# Patient Record
Sex: MALE | Race: WHITE | Employment: UNEMPLOYED | URBAN - METROPOLITAN AREA
[De-identification: names, ages, dates, MRNs, and addresses within clinical notes are randomized per-mention and may not be internally consistent; named-entity substitution may affect disease eponyms.]

---

## 2021-03-17 ENCOUNTER — APPOINTMENT (OUTPATIENT)
Dept: CT IMAGING | Age: 41
End: 2021-03-17
Payer: MEDICARE

## 2021-03-17 ENCOUNTER — HOSPITAL ENCOUNTER (EMERGENCY)
Age: 41
Discharge: HOME | End: 2021-03-17
Payer: MEDICARE

## 2021-03-17 ENCOUNTER — APPOINTMENT (OUTPATIENT)
Dept: GENERAL RADIOLOGY | Age: 41
End: 2021-03-17
Payer: MEDICARE

## 2021-03-17 VITALS
SYSTOLIC BLOOD PRESSURE: 124 MMHG | HEART RATE: 88 BPM | HEIGHT: 60 IN | TEMPERATURE: 97.8 F | BODY MASS INDEX: 25.52 KG/M2 | WEIGHT: 130 LBS | RESPIRATION RATE: 18 BRPM | DIASTOLIC BLOOD PRESSURE: 86 MMHG | OXYGEN SATURATION: 99 %

## 2021-03-17 DIAGNOSIS — F10.920 ACUTE ALCOHOLIC INTOXICATION WITHOUT COMPLICATION (HCC): Primary | ICD-10-CM

## 2021-03-17 LAB
ALBUMIN SERPL-MCNC: 4.7 G/DL (ref 3.5–4.6)
ALP BLD-CCNC: 62 U/L (ref 35–104)
ALT SERPL-CCNC: 25 U/L (ref 0–41)
ANION GAP SERPL CALCULATED.3IONS-SCNC: 9 MEQ/L (ref 9–15)
APTT: 24.8 SEC (ref 24.4–36.8)
AST SERPL-CCNC: 42 U/L (ref 0–40)
BASOPHILS ABSOLUTE: 0.2 K/UL (ref 0–0.2)
BASOPHILS RELATIVE PERCENT: 3.5 %
BILIRUB SERPL-MCNC: 0.3 MG/DL (ref 0.2–0.7)
BUN BLDV-MCNC: 7 MG/DL (ref 6–20)
CALCIUM SERPL-MCNC: 9 MG/DL (ref 8.5–9.9)
CHLORIDE BLD-SCNC: 110 MEQ/L (ref 95–107)
CO2: 21 MEQ/L (ref 20–31)
CREAT SERPL-MCNC: 1.09 MG/DL (ref 0.7–1.2)
EOSINOPHILS ABSOLUTE: 0.3 K/UL (ref 0–0.7)
EOSINOPHILS RELATIVE PERCENT: 5.9 %
ETHANOL PERCENT: 0.16 G/DL
ETHANOL: 187 MG/DL (ref 0–0.08)
GFR AFRICAN AMERICAN: >60
GFR NON-AFRICAN AMERICAN: >60
GLOBULIN: 2.4 G/DL (ref 2.3–3.5)
GLUCOSE BLD-MCNC: 94 MG/DL (ref 70–99)
HCT VFR BLD CALC: 49.8 % (ref 42–52)
HEMOGLOBIN: 16.6 G/DL (ref 14–18)
INR BLD: 1
LYMPHOCYTES ABSOLUTE: 1.2 K/UL (ref 1–4.8)
LYMPHOCYTES RELATIVE PERCENT: 22 %
MACROCYTES: ABNORMAL
MCH RBC QN AUTO: 35.2 PG (ref 27–31.3)
MCHC RBC AUTO-ENTMCNC: 33.3 % (ref 33–37)
MCV RBC AUTO: 105.5 FL (ref 80–100)
MONOCYTES ABSOLUTE: 0.4 K/UL (ref 0.2–0.8)
MONOCYTES RELATIVE PERCENT: 7.4 %
NEUTROPHILS ABSOLUTE: 3.5 K/UL (ref 1.4–6.5)
NEUTROPHILS RELATIVE PERCENT: 61.2 %
PDW BLD-RTO: 14.5 % (ref 11.5–14.5)
PLATELET # BLD: 259 K/UL (ref 130–400)
PLATELET SLIDE REVIEW: NORMAL
POTASSIUM SERPL-SCNC: 3.7 MEQ/L (ref 3.4–4.9)
PROTHROMBIN TIME: 12.8 SEC (ref 12.3–14.9)
RBC # BLD: 4.72 M/UL (ref 4.7–6.1)
SODIUM BLD-SCNC: 140 MEQ/L (ref 135–144)
TOTAL PROTEIN: 7.1 G/DL (ref 6.3–8)
WBC # BLD: 5.7 K/UL (ref 4.8–10.8)

## 2021-03-17 PROCEDURE — 82077 ASSAY SPEC XCP UR&BREATH IA: CPT

## 2021-03-17 PROCEDURE — 99285 EMERGENCY DEPT VISIT HI MDM: CPT

## 2021-03-17 PROCEDURE — 80053 COMPREHEN METABOLIC PANEL: CPT

## 2021-03-17 PROCEDURE — 85730 THROMBOPLASTIN TIME PARTIAL: CPT

## 2021-03-17 PROCEDURE — 72125 CT NECK SPINE W/O DYE: CPT

## 2021-03-17 PROCEDURE — 6830039000 HC L3 TRAUMA ALERT

## 2021-03-17 PROCEDURE — 73130 X-RAY EXAM OF HAND: CPT

## 2021-03-17 PROCEDURE — 85610 PROTHROMBIN TIME: CPT

## 2021-03-17 PROCEDURE — 73110 X-RAY EXAM OF WRIST: CPT

## 2021-03-17 PROCEDURE — 73562 X-RAY EXAM OF KNEE 3: CPT

## 2021-03-17 PROCEDURE — 85025 COMPLETE CBC W/AUTO DIFF WBC: CPT

## 2021-03-17 PROCEDURE — 70450 CT HEAD/BRAIN W/O DYE: CPT

## 2021-03-17 PROCEDURE — 36415 COLL VENOUS BLD VENIPUNCTURE: CPT

## 2021-03-17 ASSESSMENT — ENCOUNTER SYMPTOMS
COUGH: 0
COLOR CHANGE: 0
SHORTNESS OF BREATH: 0
EYE DISCHARGE: 0
SORE THROAT: 0
DIARRHEA: 0
CONSTIPATION: 0
BACK PAIN: 0
WHEEZING: 0
VOMITING: 0
ABDOMINAL PAIN: 0
BLOOD IN STOOL: 0
EYE REDNESS: 0
RHINORRHEA: 0
EYE PAIN: 0
TROUBLE SWALLOWING: 0
NAUSEA: 0

## 2021-03-17 ASSESSMENT — PAIN SCALES - GENERAL: PAINLEVEL_OUTOF10: 7
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
## 2021-03-17 NOTE — ED NOTES
This RN at bedside for rounding and pt noted to have eloped at this time. Alice Silver NP advised.      Mellissa Aponte RN  03/17/21 2224
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
## 2021-03-17 NOTE — ED NOTES
Pt sitting upright on side of ED cot at this time w/no s/s of distress noted. Resp even and unlabored. [place] [employer] at bedside. Will continue to monitor pt.      Giulia Reyes RN  03/17/21 1040

## 2021-10-04 ENCOUNTER — HOSPITAL ENCOUNTER (INPATIENT)
Age: 41
LOS: 3 days | Discharge: HOME | DRG: 896 | End: 2021-10-08
Attending: EMERGENCY MEDICINE | Admitting: INTERNAL MEDICINE
Payer: MEDICARE

## 2021-10-04 ENCOUNTER — APPOINTMENT (OUTPATIENT)
Dept: CT IMAGING | Age: 41
DRG: 896 | End: 2021-10-04
Payer: MEDICARE

## 2021-10-04 ENCOUNTER — APPOINTMENT (OUTPATIENT)
Dept: GENERAL RADIOLOGY | Age: 41
DRG: 896 | End: 2021-10-04
Payer: MEDICARE

## 2021-10-04 DIAGNOSIS — R41.82 ALTERED MENTAL STATUS, UNSPECIFIED ALTERED MENTAL STATUS TYPE: ICD-10-CM

## 2021-10-04 DIAGNOSIS — E87.1 HYPONATREMIA: Primary | ICD-10-CM

## 2021-10-04 DIAGNOSIS — K92.2 UPPER GI BLEED: ICD-10-CM

## 2021-10-04 LAB
BASOPHILS ABSOLUTE: 0 K/UL (ref 0–0.2)
BASOPHILS RELATIVE PERCENT: 0.1 %
EOSINOPHILS ABSOLUTE: 0 K/UL (ref 0–0.7)
EOSINOPHILS RELATIVE PERCENT: 0 %
ETHANOL PERCENT: NORMAL G/DL
ETHANOL: <10 MG/DL (ref 0–0.08)
HCT VFR BLD CALC: 40.8 % (ref 42–52)
HEMOGLOBIN: 14.6 G/DL (ref 14–18)
LACTIC ACID: 1.6 MMOL/L (ref 0.5–2.2)
LIPASE: 21 U/L (ref 12–95)
LYMPHOCYTES ABSOLUTE: 0.4 K/UL (ref 1–4.8)
LYMPHOCYTES RELATIVE PERCENT: 3.5 %
MAGNESIUM: 2.3 MG/DL (ref 1.7–2.4)
MCH RBC QN AUTO: 35 PG (ref 27–31.3)
MCHC RBC AUTO-ENTMCNC: 35.8 % (ref 33–37)
MCV RBC AUTO: 97.9 FL (ref 80–100)
MONOCYTES ABSOLUTE: 0.5 K/UL (ref 0.2–0.8)
MONOCYTES RELATIVE PERCENT: 4.3 %
NEUTROPHILS ABSOLUTE: 10.5 K/UL (ref 1.4–6.5)
NEUTROPHILS RELATIVE PERCENT: 92.1 %
PDW BLD-RTO: 13.2 % (ref 11.5–14.5)
PLATELET # BLD: 207 K/UL (ref 130–400)
RBC # BLD: 4.17 M/UL (ref 4.7–6.1)
WBC # BLD: 11.4 K/UL (ref 4.8–10.8)

## 2021-10-04 PROCEDURE — 86850 RBC ANTIBODY SCREEN: CPT

## 2021-10-04 PROCEDURE — 82077 ASSAY SPEC XCP UR&BREATH IA: CPT

## 2021-10-04 PROCEDURE — 70486 CT MAXILLOFACIAL W/O DYE: CPT

## 2021-10-04 PROCEDURE — 80053 COMPREHEN METABOLIC PANEL: CPT

## 2021-10-04 PROCEDURE — 83605 ASSAY OF LACTIC ACID: CPT

## 2021-10-04 PROCEDURE — 86900 BLOOD TYPING SEROLOGIC ABO: CPT

## 2021-10-04 PROCEDURE — 70450 CT HEAD/BRAIN W/O DYE: CPT

## 2021-10-04 PROCEDURE — 99285 EMERGENCY DEPT VISIT HI MDM: CPT

## 2021-10-04 PROCEDURE — 2580000003 HC RX 258: Performed by: EMERGENCY MEDICINE

## 2021-10-04 PROCEDURE — 96374 THER/PROPH/DIAG INJ IV PUSH: CPT

## 2021-10-04 PROCEDURE — 6360000002 HC RX W HCPCS: Performed by: EMERGENCY MEDICINE

## 2021-10-04 PROCEDURE — 72125 CT NECK SPINE W/O DYE: CPT

## 2021-10-04 PROCEDURE — 83690 ASSAY OF LIPASE: CPT

## 2021-10-04 PROCEDURE — 86901 BLOOD TYPING SEROLOGIC RH(D): CPT

## 2021-10-04 PROCEDURE — 74176 CT ABD & PELVIS W/O CONTRAST: CPT

## 2021-10-04 PROCEDURE — 36415 COLL VENOUS BLD VENIPUNCTURE: CPT

## 2021-10-04 PROCEDURE — 83735 ASSAY OF MAGNESIUM: CPT

## 2021-10-04 PROCEDURE — 96375 TX/PRO/DX INJ NEW DRUG ADDON: CPT

## 2021-10-04 PROCEDURE — 85025 COMPLETE CBC W/AUTO DIFF WBC: CPT

## 2021-10-04 PROCEDURE — 71250 CT THORAX DX C-: CPT

## 2021-10-04 PROCEDURE — 96376 TX/PRO/DX INJ SAME DRUG ADON: CPT

## 2021-10-04 RX ORDER — LORAZEPAM 2 MG/ML
2 INJECTION INTRAMUSCULAR ONCE
Status: COMPLETED | OUTPATIENT
Start: 2021-10-04 | End: 2021-10-04

## 2021-10-04 RX ORDER — LORAZEPAM 2 MG/ML
1 INJECTION INTRAMUSCULAR ONCE
Status: COMPLETED | OUTPATIENT
Start: 2021-10-04 | End: 2021-10-04

## 2021-10-04 RX ORDER — 0.9 % SODIUM CHLORIDE 0.9 %
1000 INTRAVENOUS SOLUTION INTRAVENOUS ONCE
Status: COMPLETED | OUTPATIENT
Start: 2021-10-04 | End: 2021-10-05

## 2021-10-04 RX ADMIN — LORAZEPAM 1 MG: 2 INJECTION INTRAMUSCULAR; INTRAVENOUS at 22:56

## 2021-10-04 RX ADMIN — SODIUM CHLORIDE 1000 ML: 9 INJECTION, SOLUTION INTRAVENOUS at 22:56

## 2021-10-04 RX ADMIN — LORAZEPAM 2 MG: 2 INJECTION INTRAMUSCULAR; INTRAVENOUS at 22:56

## 2021-10-04 NOTE — Clinical Note
Patient Class: Inpatient [101]   REQUIRED: Diagnosis: Hyponatremia [657749]   Estimated Length of Stay: Estimated stay of more than 2 midnights   Admitting Provider: Aramis SAUNDERS 12 [[id number]]   Telemetry/Cardiac Monitoring Required?: Yes

## 2021-10-05 ENCOUNTER — APPOINTMENT (OUTPATIENT)
Dept: ULTRASOUND IMAGING | Age: 41
DRG: 896 | End: 2021-10-05
Payer: MEDICARE

## 2021-10-05 PROBLEM — K92.2 GI BLEED: Status: ACTIVE | Noted: 2021-10-05

## 2021-10-05 PROBLEM — E87.1 HYPONATREMIA: Status: ACTIVE | Noted: 2021-10-05

## 2021-10-05 PROBLEM — E87.6 HYPOKALEMIA: Status: ACTIVE | Noted: 2021-10-05

## 2021-10-05 PROBLEM — G91.9 HYDROCEPHALUS (HCC): Status: ACTIVE | Noted: 2021-10-05

## 2021-10-05 PROBLEM — G93.40 ENCEPHALOPATHY: Status: ACTIVE | Noted: 2021-10-05

## 2021-10-05 PROBLEM — R79.89 ELEVATED LFTS: Status: ACTIVE | Noted: 2021-10-05

## 2021-10-05 LAB
ABO/RH: NORMAL
ALBUMIN SERPL-MCNC: 4.6 G/DL (ref 3.5–4.6)
ALP BLD-CCNC: 94 U/L (ref 35–104)
ALT SERPL-CCNC: 141 U/L (ref 0–41)
AMMONIA: 12 UMOL/L (ref 16–60)
ANION GAP SERPL CALCULATED.3IONS-SCNC: 13 MEQ/L (ref 9–15)
ANION GAP SERPL CALCULATED.3IONS-SCNC: 16 MEQ/L (ref 9–15)
ANION GAP SERPL CALCULATED.3IONS-SCNC: 18 MEQ/L (ref 9–15)
ANTIBODY SCREEN: NORMAL
AST SERPL-CCNC: 570 U/L (ref 0–40)
BILIRUB SERPL-MCNC: 1.8 MG/DL (ref 0.2–0.7)
BUN BLDV-MCNC: 18 MG/DL (ref 6–20)
BUN BLDV-MCNC: 22 MG/DL (ref 6–20)
BUN BLDV-MCNC: 26 MG/DL (ref 6–20)
C-REACTIVE PROTEIN: 140.6 MG/L (ref 0–5)
CALCIUM SERPL-MCNC: 8.5 MG/DL (ref 8.5–9.9)
CALCIUM SERPL-MCNC: 8.7 MG/DL (ref 8.5–9.9)
CALCIUM SERPL-MCNC: 9.9 MG/DL (ref 8.5–9.9)
CHLORIDE BLD-SCNC: 75 MEQ/L (ref 95–107)
CHLORIDE BLD-SCNC: 85 MEQ/L (ref 95–107)
CHLORIDE BLD-SCNC: 87 MEQ/L (ref 95–107)
CO2: 22 MEQ/L (ref 20–31)
CO2: 22 MEQ/L (ref 20–31)
CO2: 26 MEQ/L (ref 20–31)
CREAT SERPL-MCNC: 0.7 MG/DL (ref 0.7–1.2)
CREAT SERPL-MCNC: 0.75 MG/DL (ref 0.7–1.2)
CREAT SERPL-MCNC: 0.9 MG/DL (ref 0.7–1.2)
GFR AFRICAN AMERICAN: >60
GFR NON-AFRICAN AMERICAN: >60
GLOBULIN: 2.9 G/DL (ref 2.3–3.5)
GLUCOSE BLD-MCNC: 110 MG/DL (ref 70–99)
GLUCOSE BLD-MCNC: 121 MG/DL (ref 70–99)
GLUCOSE BLD-MCNC: 82 MG/DL (ref 70–99)
INR BLD: 1
MAGNESIUM: 2.3 MG/DL (ref 1.7–2.4)
PERFORMED ON: NORMAL
POC CREATININE: 1.2 MG/DL (ref 0.9–1.3)
POC SAMPLE TYPE: NORMAL
POTASSIUM REFLEX MAGNESIUM: 2.9 MEQ/L (ref 3.4–4.9)
POTASSIUM SERPL-SCNC: 3.1 MEQ/L (ref 3.4–4.9)
POTASSIUM SERPL-SCNC: 3.2 MEQ/L (ref 3.4–4.9)
PROCALCITONIN: 2.14 NG/ML (ref 0–0.15)
PROTHROMBIN TIME: 12.8 SEC (ref 12.3–14.9)
SARS-COV-2, NAAT: NOT DETECTED
SEDIMENTATION RATE, ERYTHROCYTE: 25 MM (ref 0–10)
SODIUM BLD-SCNC: 119 MEQ/L (ref 135–144)
SODIUM BLD-SCNC: 122 MEQ/L (ref 135–144)
SODIUM BLD-SCNC: 123 MEQ/L (ref 135–144)
T4 FREE: 0.59 NG/DL (ref 0.84–1.68)
TOTAL PROTEIN: 7.5 G/DL (ref 6.3–8)
TSH REFLEX: 4.54 UIU/ML (ref 0.44–3.86)

## 2021-10-05 PROCEDURE — 6360000002 HC RX W HCPCS: Performed by: EMERGENCY MEDICINE

## 2021-10-05 PROCEDURE — 84439 ASSAY OF FREE THYROXINE: CPT

## 2021-10-05 PROCEDURE — 6370000000 HC RX 637 (ALT 250 FOR IP): Performed by: INTERNAL MEDICINE

## 2021-10-05 PROCEDURE — 84443 ASSAY THYROID STIM HORMONE: CPT

## 2021-10-05 PROCEDURE — 99222 1ST HOSP IP/OBS MODERATE 55: CPT | Performed by: PSYCHIATRY & NEUROLOGY

## 2021-10-05 PROCEDURE — 87635 SARS-COV-2 COVID-19 AMP PRB: CPT

## 2021-10-05 PROCEDURE — 82140 ASSAY OF AMMONIA: CPT

## 2021-10-05 PROCEDURE — 2580000003 HC RX 258: Performed by: INTERNAL MEDICINE

## 2021-10-05 PROCEDURE — 85610 PROTHROMBIN TIME: CPT

## 2021-10-05 PROCEDURE — 6360000002 HC RX W HCPCS: Performed by: INTERNAL MEDICINE

## 2021-10-05 PROCEDURE — 76705 ECHO EXAM OF ABDOMEN: CPT

## 2021-10-05 PROCEDURE — 2500000003 HC RX 250 WO HCPCS: Performed by: INTERNAL MEDICINE

## 2021-10-05 PROCEDURE — 6370000000 HC RX 637 (ALT 250 FOR IP): Performed by: NURSE PRACTITIONER

## 2021-10-05 PROCEDURE — 36415 COLL VENOUS BLD VENIPUNCTURE: CPT

## 2021-10-05 PROCEDURE — 2500000003 HC RX 250 WO HCPCS: Performed by: EMERGENCY MEDICINE

## 2021-10-05 PROCEDURE — C9113 INJ PANTOPRAZOLE SODIUM, VIA: HCPCS | Performed by: INTERNAL MEDICINE

## 2021-10-05 PROCEDURE — 85652 RBC SED RATE AUTOMATED: CPT

## 2021-10-05 PROCEDURE — 83930 ASSAY OF BLOOD OSMOLALITY: CPT

## 2021-10-05 PROCEDURE — 80048 BASIC METABOLIC PNL TOTAL CA: CPT

## 2021-10-05 PROCEDURE — 86140 C-REACTIVE PROTEIN: CPT

## 2021-10-05 PROCEDURE — 99221 1ST HOSP IP/OBS SF/LOW 40: CPT | Performed by: INTERNAL MEDICINE

## 2021-10-05 PROCEDURE — 6360000002 HC RX W HCPCS: Performed by: NURSE PRACTITIONER

## 2021-10-05 PROCEDURE — 83735 ASSAY OF MAGNESIUM: CPT

## 2021-10-05 PROCEDURE — 2580000003 HC RX 258: Performed by: NURSE PRACTITIONER

## 2021-10-05 PROCEDURE — 1210000000 HC MED SURG R&B

## 2021-10-05 PROCEDURE — 84145 PROCALCITONIN (PCT): CPT

## 2021-10-05 RX ORDER — SODIUM CHLORIDE 0.9 % (FLUSH) 0.9 %
5-40 SYRINGE (ML) INJECTION EVERY 12 HOURS SCHEDULED
Status: DISCONTINUED | OUTPATIENT
Start: 2021-10-05 | End: 2021-10-08 | Stop reason: HOSPADM

## 2021-10-05 RX ORDER — LORAZEPAM 2 MG/ML
3 INJECTION INTRAMUSCULAR
Status: DISCONTINUED | OUTPATIENT
Start: 2021-10-05 | End: 2021-10-08 | Stop reason: HOSPADM

## 2021-10-05 RX ORDER — MAGNESIUM SULFATE IN WATER 40 MG/ML
2000 INJECTION, SOLUTION INTRAVENOUS PRN
Status: DISCONTINUED | OUTPATIENT
Start: 2021-10-05 | End: 2021-10-08 | Stop reason: HOSPADM

## 2021-10-05 RX ORDER — LACTULOSE 10 G/15ML
20 SOLUTION ORAL 3 TIMES DAILY
Status: DISCONTINUED | OUTPATIENT
Start: 2021-10-05 | End: 2021-10-07

## 2021-10-05 RX ORDER — ACETAMINOPHEN 325 MG/1
650 TABLET ORAL EVERY 6 HOURS PRN
Status: DISCONTINUED | OUTPATIENT
Start: 2021-10-05 | End: 2021-10-08 | Stop reason: HOSPADM

## 2021-10-05 RX ORDER — POLYETHYLENE GLYCOL 3350 17 G/17G
17 POWDER, FOR SOLUTION ORAL DAILY PRN
Status: DISCONTINUED | OUTPATIENT
Start: 2021-10-05 | End: 2021-10-08 | Stop reason: HOSPADM

## 2021-10-05 RX ORDER — LORAZEPAM 1 MG/1
3 TABLET ORAL
Status: DISCONTINUED | OUTPATIENT
Start: 2021-10-05 | End: 2021-10-08 | Stop reason: HOSPADM

## 2021-10-05 RX ORDER — ONDANSETRON 2 MG/ML
4 INJECTION INTRAMUSCULAR; INTRAVENOUS EVERY 6 HOURS PRN
Status: DISCONTINUED | OUTPATIENT
Start: 2021-10-05 | End: 2021-10-08 | Stop reason: HOSPADM

## 2021-10-05 RX ORDER — SODIUM CHLORIDE 9 MG/ML
25 INJECTION, SOLUTION INTRAVENOUS PRN
Status: DISCONTINUED | OUTPATIENT
Start: 2021-10-05 | End: 2021-10-08 | Stop reason: HOSPADM

## 2021-10-05 RX ORDER — LORAZEPAM 2 MG/ML
4 INJECTION INTRAMUSCULAR
Status: DISCONTINUED | OUTPATIENT
Start: 2021-10-05 | End: 2021-10-08 | Stop reason: HOSPADM

## 2021-10-05 RX ORDER — DEXTROSE AND SODIUM CHLORIDE 5; .9 G/100ML; G/100ML
INJECTION, SOLUTION INTRAVENOUS CONTINUOUS
Status: DISCONTINUED | OUTPATIENT
Start: 2021-10-05 | End: 2021-10-06

## 2021-10-05 RX ORDER — LORAZEPAM 2 MG/ML
2 INJECTION INTRAMUSCULAR ONCE
Status: COMPLETED | OUTPATIENT
Start: 2021-10-05 | End: 2021-10-05

## 2021-10-05 RX ORDER — SODIUM CHLORIDE 0.9 % (FLUSH) 0.9 %
5-40 SYRINGE (ML) INJECTION PRN
Status: DISCONTINUED | OUTPATIENT
Start: 2021-10-05 | End: 2021-10-08 | Stop reason: HOSPADM

## 2021-10-05 RX ORDER — LORAZEPAM 1 MG/1
1 TABLET ORAL
Status: DISCONTINUED | OUTPATIENT
Start: 2021-10-05 | End: 2021-10-08 | Stop reason: HOSPADM

## 2021-10-05 RX ORDER — SODIUM CHLORIDE 9 MG/ML
10 INJECTION INTRAVENOUS 2 TIMES DAILY
Status: DISCONTINUED | OUTPATIENT
Start: 2021-10-05 | End: 2021-10-08 | Stop reason: HOSPADM

## 2021-10-05 RX ORDER — LORAZEPAM 1 MG/1
2 TABLET ORAL
Status: DISCONTINUED | OUTPATIENT
Start: 2021-10-05 | End: 2021-10-08 | Stop reason: HOSPADM

## 2021-10-05 RX ORDER — POTASSIUM CHLORIDE 7.45 MG/ML
10 INJECTION INTRAVENOUS PRN
Status: DISCONTINUED | OUTPATIENT
Start: 2021-10-05 | End: 2021-10-08 | Stop reason: HOSPADM

## 2021-10-05 RX ORDER — ONDANSETRON 4 MG/1
4 TABLET, ORALLY DISINTEGRATING ORAL EVERY 8 HOURS PRN
Status: DISCONTINUED | OUTPATIENT
Start: 2021-10-05 | End: 2021-10-08 | Stop reason: HOSPADM

## 2021-10-05 RX ORDER — LORAZEPAM 2 MG/ML
1 INJECTION INTRAMUSCULAR
Status: DISCONTINUED | OUTPATIENT
Start: 2021-10-05 | End: 2021-10-08 | Stop reason: HOSPADM

## 2021-10-05 RX ORDER — MULTIVIT-MIN/FERROUS GLUCONATE 9 MG/15 ML
15 LIQUID (ML) ORAL DAILY
Status: DISCONTINUED | OUTPATIENT
Start: 2021-10-05 | End: 2021-10-08 | Stop reason: HOSPADM

## 2021-10-05 RX ORDER — LORAZEPAM 2 MG/ML
2 INJECTION INTRAMUSCULAR
Status: DISCONTINUED | OUTPATIENT
Start: 2021-10-05 | End: 2021-10-08 | Stop reason: HOSPADM

## 2021-10-05 RX ORDER — POTASSIUM CHLORIDE 7.45 MG/ML
10 INJECTION INTRAVENOUS
Status: DISCONTINUED | OUTPATIENT
Start: 2021-10-05 | End: 2021-10-05

## 2021-10-05 RX ORDER — THIAMINE HYDROCHLORIDE 100 MG/ML
100 INJECTION, SOLUTION INTRAMUSCULAR; INTRAVENOUS DAILY
Status: DISCONTINUED | OUTPATIENT
Start: 2021-10-05 | End: 2021-10-07

## 2021-10-05 RX ORDER — ACETAMINOPHEN 650 MG/1
650 SUPPOSITORY RECTAL EVERY 6 HOURS PRN
Status: DISCONTINUED | OUTPATIENT
Start: 2021-10-05 | End: 2021-10-08 | Stop reason: HOSPADM

## 2021-10-05 RX ORDER — LORAZEPAM 1 MG/1
4 TABLET ORAL
Status: DISCONTINUED | OUTPATIENT
Start: 2021-10-05 | End: 2021-10-08 | Stop reason: HOSPADM

## 2021-10-05 RX ORDER — PANTOPRAZOLE SODIUM 40 MG/10ML
40 INJECTION, POWDER, LYOPHILIZED, FOR SOLUTION INTRAVENOUS 2 TIMES DAILY
Status: DISCONTINUED | OUTPATIENT
Start: 2021-10-05 | End: 2021-10-07

## 2021-10-05 RX ADMIN — Medication 10 ML: at 20:36

## 2021-10-05 RX ADMIN — SODIUM CHLORIDE, PRESERVATIVE FREE 10 ML: 5 INJECTION INTRAVENOUS at 20:54

## 2021-10-05 RX ADMIN — PANTOPRAZOLE SODIUM 40 MG: 40 INJECTION, POWDER, FOR SOLUTION INTRAVENOUS at 10:00

## 2021-10-05 RX ADMIN — OCTREOTIDE ACETATE 25 MCG/HR: 500 INJECTION, SOLUTION INTRAVENOUS; SUBCUTANEOUS at 05:50

## 2021-10-05 RX ADMIN — DEXTROSE AND SODIUM CHLORIDE: 5; 900 INJECTION, SOLUTION INTRAVENOUS at 12:11

## 2021-10-05 RX ADMIN — POTASSIUM CHLORIDE 10 MEQ: 7.46 INJECTION, SOLUTION INTRAVENOUS at 21:37

## 2021-10-05 RX ADMIN — DEXTROSE AND SODIUM CHLORIDE: 5; 900 INJECTION, SOLUTION INTRAVENOUS at 20:54

## 2021-10-05 RX ADMIN — Medication 15 ML: at 12:11

## 2021-10-05 RX ADMIN — Medication 10 ML: at 12:12

## 2021-10-05 RX ADMIN — Medication 10 ML: at 10:00

## 2021-10-05 RX ADMIN — SODIUM CHLORIDE 0.5 MCG/KG/HR: 9 INJECTION, SOLUTION INTRAVENOUS at 05:34

## 2021-10-05 RX ADMIN — POTASSIUM CHLORIDE 10 MEQ: 7.46 INJECTION, SOLUTION INTRAVENOUS at 22:25

## 2021-10-05 RX ADMIN — LACTULOSE 20 G: 20 SOLUTION ORAL at 20:36

## 2021-10-05 RX ADMIN — LACTULOSE 20 G: 20 SOLUTION ORAL at 10:00

## 2021-10-05 RX ADMIN — CEFTRIAXONE SODIUM 1000 MG: 1 INJECTION, POWDER, FOR SOLUTION INTRAMUSCULAR; INTRAVENOUS at 12:17

## 2021-10-05 RX ADMIN — FAMOTIDINE 40 MG: 10 INJECTION INTRAVENOUS at 02:45

## 2021-10-05 RX ADMIN — PANTOPRAZOLE SODIUM 40 MG: 40 INJECTION, POWDER, FOR SOLUTION INTRAVENOUS at 20:54

## 2021-10-05 RX ADMIN — SODIUM CHLORIDE, PRESERVATIVE FREE 10 ML: 5 INJECTION INTRAVENOUS at 10:00

## 2021-10-05 RX ADMIN — LACTULOSE 20 G: 20 SOLUTION ORAL at 14:45

## 2021-10-05 RX ADMIN — LORAZEPAM 2 MG: 2 INJECTION INTRAMUSCULAR; INTRAVENOUS at 03:09

## 2021-10-05 RX ADMIN — POTASSIUM CHLORIDE 10 MEQ: 7.46 INJECTION, SOLUTION INTRAVENOUS at 20:35

## 2021-10-05 RX ADMIN — THIAMINE HYDROCHLORIDE 100 MG: 100 INJECTION, SOLUTION INTRAMUSCULAR; INTRAVENOUS at 12:15

## 2021-10-05 ASSESSMENT — PAIN SCALES - WONG BAKER
WONGBAKER_NUMERICALRESPONSE: 0
WONGBAKER_NUMERICALRESPONSE: 0
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
## 2021-10-05 NOTE — CONSULTS
Consult to Gastroenterology  Consult performed by: Tammy Quinonez MD  Consult ordered by: Kenrick Bella MD      Patient Name: Josue Dominguez Date: 10/4/2021 10:00 PM  MR #: [id number]  : 1980    Attending Physician: Krystle Gleason DO  Reason for consult: Upper GI bleed  History Obtained From:  electronic medical record, staff nurse  History of Presenting Illness:      Edna Villalobos is a 41 y.o. male on hospital day 0 with PMH neuroblastoma (@1 years old s/p resection/ shunt and multiple revisions), Graves disease, chronic alcohol abuse, admitted with a history of acute metabolic encephalopathy. GI consulted for upper GI bleed. Of note, patient's HPI is limited due to patient's mental status/encephalopathy. Patient was found wandering the streets behind a restaurant, was admitted to the hospital, severely agitated. Patient was found to have dark stools in the ER, guaiac positive. Did give some short answers during interview. When asked how much alcohol he drinks he stated, \" not much. \"  Per nursing, patient has had no further bowel movement since admission to the floor. Of note, on 3/17/2021, Hgb 16.6, .5, platelets 116, albumin 4.7, ALT 62, AST 25, ethanol level 187. On admit, WBCs 11.4, Hgb 14.6, MCV 97.9, platelets 447, sodium 119, potassium 3.1, BUN 26, creatinine 0.90, albumin 4.6, bilirubin 1.8, alkaline phosphatase 94, , , INR 1.0, ethanol level less than 10. He received Ativan 3 mg IV in the ER due to ongoing agitation. He was started on Precedex drip that was discontinued on the floor today. He was also given Protonix bolus and infusion. Today, ammonia 12. Previous endoscopic history:   No records    History:      Past Medical History:   Diagnosis Date    Brain tumor ([facility] [address]     Graves disease      No past surgical history on file. Family History  No family history on file.   [x] Unable to obtain due to ventilated and/ or neurologic status  Social History     Socioeconomic History    Marital status: Single     Spouse name: Not on file    Number of children: Not on file    Years of education: Not on file    Highest education level: Not on file   Occupational History    Not on file   Tobacco Use    Smoking status: Current Every Day Smoker     Packs/day: 2.00     Types: Cigarettes    Smokeless tobacco: Never Used   Substance and Sexual Activity    Alcohol use: Yes     Comment: Every couple of days    Drug use: Yes     Types: Marijuana     Comment: ocassionally    Sexual activity: Not on file   Other Topics Concern    Not on file   Social History Narrative    Not on file     Social Determinants of Health     Financial Resource Strain:     Difficulty of Paying Living Expenses:    Food Insecurity:     Worried About Running Out of Food in the Last Year:     Ran Out of Food in the Last Year:    Transportation Needs:     Lack of Transportation (Medical):  Lack of Transportation (Non-Medical):    Physical Activity:     Days of Exercise per Week:     Minutes of Exercise per Session:    Stress:     Feeling of Stress :    Social Connections:     Frequency of Communication with Friends and Family:     Frequency of Social Gatherings with Friends and Family:     Attends [employer] Services:     Active Member of Clubs or Organizations:     Attends Club or Organization Meetings:     Marital Status:    Intimate Partner Violence:     Fear of Current or Ex-Partner:     Emotionally Abused:     Physically Abused:     Sexually Abused:       [x] Unable to obtain due to ventilated and/ or neurologic status    Home Medications:      No medications prior to admission.     Current Hospital Medications:   Scheduled Meds:   sodium chloride flush  5-40 mL IntraVENous 2 times per day    pantoprazole  40 mg IntraVENous BID    And    sodium chloride (PF)  10 mL IntraVENous BID    cefTRIAXone (ROCEPHIN) IV  1,000 mg IntraVENous Q24H    lactulose  20 g Oral TID    sodium chloride flush  5-40 mL IntraVENous 2 times per day    thiamine  100 mg IntraVENous Daily    CENTRUM/CERTA-ANI with minerals oral  15 mL Oral Daily     Continuous Infusions:   sodium chloride      octreotide (SANDOSTATIN) infusion 25 mcg/hr (10/05/21 0550)    dexmedetomidine 0.5 mcg/kg/hr (10/05/21 0534)    dextrose 5 % and 0.9 % NaCl      sodium chloride       PRN Meds:.sodium chloride flush, sodium chloride, ondansetron **OR** ondansetron, polyethylene glycol, acetaminophen **OR** acetaminophen, potassium chloride, magnesium sulfate, sodium chloride flush, sodium chloride, LORazepam **OR** LORazepam **OR** LORazepam **OR** LORazepam **OR** LORazepam **OR** LORazepam **OR** LORazepam **OR** LORazepam   sodium chloride      octreotide (SANDOSTATIN) infusion 25 mcg/hr (10/05/21 0550)    dexmedetomidine 0.5 mcg/kg/hr (10/05/21 0534)    dextrose 5 % and 0.9 % NaCl      sodium chloride        Allergies: Allergies   Allergen Reactions    Red Dye       Review of Systems:        Unable to obtain due to patient's mental status/encephalopathy    Objective Findings:     Vitals:   Vitals:    10/05/21 0515 10/05/21 0530 10/05/21 0545 10/05/21 0839   BP: 114/79 127/85 (!) 122/103 127/65   Pulse: 106 105 103 92   Resp:    16   Temp:    98.1 °F (36.7 °C)   TempSrc:       SpO2: 98% 98% 95% 94%   Weight:       Height:          Physical Examination:  General: At time of his interview patient was somewhat lethargic, did answer yes/no to some questions, knew he was in a hospital  HEENT: Normocephalic, no scleral icterus. Neck: No JVD. Heart: Regular, no murmur, no rub/gallop. No RV heave. Lungs: Clear to ascultation, no rales/wheezing/rhonchi. Good chest wall excursion. Abdomen: Appearance: No Distension, Soft , no tenderness, Bowel sounds normal  Extremities: No clubbing/cyanosis, no edema. Skin: Warm, dry, normal turgor, no rash, no petechiae, abrasion noted on right upper cheek.eye. Neuro:  No myoclonus or tremor   Psych: Normal affect    Results/ Medications reviewed 10/5/2021, 9:06 AM     Laboratory, Microbiology, Pathology, Radiology, Cardiology, Medications and Transcriptions reviewed  Scheduled Meds:   sodium chloride flush  5-40 mL IntraVENous 2 times per day    pantoprazole  40 mg IntraVENous BID    And    sodium chloride (PF)  10 mL IntraVENous BID    cefTRIAXone (ROCEPHIN) IV  1,000 mg IntraVENous Q24H    lactulose  20 g Oral TID    sodium chloride flush  5-40 mL IntraVENous 2 times per day    thiamine  100 mg IntraVENous Daily    CENTRUM/CERTA-ANI with minerals oral  15 mL Oral Daily     Continuous Infusions:   sodium chloride      octreotide (SANDOSTATIN) infusion 25 mcg/hr (10/05/21 0550)    dexmedetomidine 0.5 mcg/kg/hr (10/05/21 0534)    dextrose 5 % and 0.9 % NaCl      sodium chloride         Recent Labs     10/04/21  2215   WBC 11.4*   HGB 14.6   HCT 40.8*   MCV 97.9        Recent Labs     10/04/21  2215   *   K 3.1*   CL 75*   CO2 26   BUN 26*   CREATININE 0.90     Recent Labs     10/04/21  2215   *   *   BILITOT 1.8*   ALKPHOS 94     Recent Labs     10/04/21  2215   LIPASE 21     Recent Labs     10/04/21  2215 10/05/21  0302   PROT 7.5  --    INR  --  1.0     CT head without contrast October 5, 2021: No acute intracranial process,  shunt appears unchanged. CT abdomen pelvis without contrast 10/5/2021: Limited study secondary to motion artifact, however essentially negative.  shunt catheter tip in mid pelvis. Ultrasound RUQ abdomen 10/5/2021: Liver normal in size/shape/echogenicity, CBD measures 3.3 mm. Negative RUQ ultrasound. Assessment:   41 y.o. male with PMH neuroblastoma (@1 years old s/p resection/ shunt and multiple revisions), Graves disease, chronic alcohol abuse, admitted with a history of acute metabolic encephalopathy. Of note, patient's HPI is limited due to patient's mental status/encephalopathy.   He was found to have dark stools in the ER, guaiac positive. Per nursing, no further bowel movements since admission to hospital floor yesterday. Of note, on 3/17/2021, Hgb 16.6, .5, platelets 237, albumin 4.7, ALT 62, AST 25, ethanol level 187. On admit, WBCs 11.4, Hgb 14.6, MCV 97.9, platelets 995, sodium 119, potassium 3.1, BUN 26, creatinine 0.90, albumin 4.6, bilirubin 1.8, alkaline phosphatase 94, , , INR 1.0, ethanol level less than 10. Today, ammonia level was 12. He received Ativan 3 mg IV in the ER due to ongoing agitation. He was started on Precedex drip that was discontinued on the floor today. He was also given Protonix bolus and infusion. Then was switched to Protonix 40 mg IV twice daily and octreotide drip on floor. Impression:  Suspect acute alcoholic hepatitis in the setting of alcohol. No clinical or radiological evidence for cirrhosis. Withdrawal/hyponatremia/encephalopathy. Dark stools in the ER raise suspicion for upper GIB (?gastritis/PUD) in the setting of alcohol use. Also in differential remains encephalopathy due to shunt infection, although less likely as patient has had no fevers, WBCs minimally elevated, CT head/abdomen reveals shunt in place/unchanged. Hgb currently stable at 14.6. Plan:   - Recommend EtOH cessation. Rec EtOH abuse counseling as outpt  - Banana bag, thiamine, folate, trace elements.  Monitor for withdrawal  - Nutritionist consult; rec multiple feedings, emphasizing breakfast and a nighttime snack, with a regular oral diet at 1.2-1.5 g of protein/kg/day and 35-40 silva/kg/day  -Monitor electrolytes and replete as needed  -Recommend r/o infectious causes: UA with reflex culture, blood cultures, obtain CXR, neurology to consider lumbar puncture (hx of  shunt)  -Monitor LFTs and INR daily   - IV PPI - 8 mg/hr for 72 hrs  - Monitor H/H and transfuse accordingly  - Continue IVF for hypotension  - Avoid NSAID's  -May discontinue octreotide    Comments: Thank you for allowing us to participate in the care of this patient. Will continue to follow. Please call if questions or concerns arise. Electronically signed by Fabian Herrmann MD on 10/5/2021 at 9:06 AM    Please note this report has been partially produced using speech recognition software and may cause contain errors related to that system including grammar, punctuation and spelling as well as words and phrases that may seem inappropriate. If there are questions or concerns please feel free to contact me to clarify.

## 2021-10-05 NOTE — ED PROVIDER NOTES
[address] [facility] ED  EMERGENCY MEDICINE     Pt Name: Janet Mattson  MRN: [id number]  [place] 1980  Date of evaluation: 10/4/2021  PCP:    Luis Felipe Buenrostro MD  Provider: Erna Fenton, [facility]       Chief Complaint   Patient presents with    Altered Mental Status       HISTORY OF PRESENT ILLNESS    HPI     47-year-old male with history of chronic alcohol use, brain tumor, Graves' disease presents to the emergency department with complaint of altered mentation by EMS. Patient was found behind a restaurant today altered. He has a bruised right eye and is confused. Unsure whether or not he was drinking today but he does have a known history of chronic alcohol use. Patient was able to tell us his name and date of birth but is unaware as to where he is. He does have signs of a past craniotomy. Patient is not complaining of any pain at this time and states that he is just cold. Triage notes and Nursing notes were reviewed by myself. Any discrepancies are addressed above. PAST MEDICAL HISTORY     Past Medical History:   Diagnosis Date    Brain tumor ([facility] [address]     Graves disease        SURGICAL HISTORY     No past surgical history on file. CURRENT MEDICATIONS       Previous Medications    No medications on file       ALLERGIES       Allergies   Allergen Reactions    Red Dye        FAMILY HISTORY     No family history on file.      SOCIAL HISTORY       Social History     Socioeconomic History    Marital status: Single     Spouse name: Not on file    Number of children: Not on file    Years of education: Not on file    Highest education level: Not on file   Occupational History    Not on file   Tobacco Use    Smoking status: Current Every Day Smoker     Packs/day: 2.00     Types: Cigarettes    Smokeless tobacco: Never Used   Substance and Sexual Activity    Alcohol use: Yes     Comment: Every couple of days    Drug use: Yes     Types: Marijuana     Comment: ocassionally    Sexual activity: Not on file   Other Topics Concern    Not on file   Social History Narrative    Not on file     Social Determinants of Health     Financial Resource Strain:     Difficulty of Paying Living Expenses:    Food Insecurity:     Worried About Running Out of Food in the Last Year:     [address] in the Last Year:    Transportation Needs:     Lack of Transportation (Medical):  Lack of Transportation (Non-Medical):    Physical Activity:     Days of Exercise per Week:     Minutes of Exercise per Session:    Stress:     Feeling of Stress :    Social Connections:     Frequency of Communication with Friends and Family:     Frequency of Social Gatherings with Friends and Family:     Attends [employer] Services:     Active Member of Clubs or Organizations:     Attends Club or Organization Meetings:     Marital Status:    Intimate Partner Violence:     Fear of Current or Ex-Partner:     Emotionally Abused:     Physically Abused:     Sexually Abused:        REVIEW OF SYSTEMS     Review of Systems   Unable to perform ROS: Mental status change       Except as noted above the remainder of the review of systems was reviewed and is negative. SCREENINGS                        PHYSICAL EXAM    (up to 7 for level 4, 8 or more for level 5)     ED Triage Vitals [10/04/21 2206]   BP Temp Temp Source Pulse Resp SpO2 Height Weight   (!) 160/84 98.2 °F (36.8 °C) Axillary 108 18 99 % 5' (1.524 m) 130 lb (59 kg)       Physical Exam  Constitutional:       Appearance: He is toxic-appearing. HENT:      Head: Normocephalic. Abrasion (Superficial to the left maxilla) present. No raccoon eyes. Comments: Extraocular motions intact without pain. Mouth/Throat:      Mouth: Mucous membranes are moist.      Comments: Dried blood on his lips  Eyes:      General: No scleral icterus. Extraocular Movements: Extraocular movements intact. Right eye: Normal extraocular motion. Left eye: Normal extraocular motion and no nystagmus. Pupils: Pupils are equal, round, and reactive to light. Right eye: Pupil is round. Left eye: Pupil is round. Cardiovascular:      Rate and Rhythm: Normal rate. Heart sounds: Normal heart sounds. Pulmonary:      Effort: Pulmonary effort is normal.      Breath sounds: Normal breath sounds. Abdominal:      General: Bowel sounds are normal.      Palpations: Abdomen is soft. Tenderness: There is no abdominal tenderness. There is no guarding. Musculoskeletal:         General: No swelling or tenderness. Normal range of motion. Cervical back: Normal range of motion and neck supple. Comments: Superficial abrasions on his knees   Skin:     General: Skin is warm. Capillary Refill: Capillary refill takes less than 2 seconds. Neurological:      Mental Status: He is alert. He is disoriented and confused. GCS: GCS eye subscore is 4. GCS verbal subscore is 4. GCS motor subscore is 5. Cranial Nerves: No cranial nerve deficit or dysarthria. Sensory: No sensory deficit. Motor: No weakness. Comments: Confused alert and oriented to self and date of birth  Amnestic to events today, smells of alcohol. DIAGNOSTIC RESULTS     EKG:(none if blank)  All EKGs are interpreted by the Emergency Department Physician who either signs or Co-signs this chart in the absence of a cardiologist.        RADIOLOGY: (none if blank)   I directly visualized the following images and reviewed the radiologist interpretations.   Interpretation per the Radiologist below, if available at the time of this note:  CT HEAD [address]    (Results Pending)   CT CERVICAL SPINE WO CONTRAST    (Results Pending)   CT FACIAL BONES WO CONTRAST    (Results Pending)   CT CHEST WO CONTRAST    (Results Pending)   CT ABDOMEN PELVIS WO CONTRAST Additional Contrast? None    (Results Pending)   CT chest without contrast shows motion artifact with minimal atelectasis or airspace disease in the anterior right upper lobe. There is distal esophageal wall thickening which may represent esophagitis but no pneumomediastinum. Otherwise nonacute findings on this contrast study. CT head without contrast shows no evidence of acute intracranial abnormality. He does have stable postoperative changes of the posterior fossa including suboccipital craniectomy and right lateral occipital cranioplasty. Stable enlarged appearance of the fourth ventricle. He does have a  shunt noted. CT facial without contrast shows no evidence of acute fracture or dislocation. Bilateral cataract surgery. CT C-spine shows no evidence of acute fracture or malalignment. Minimal hazy airspace disease in the right upper anterior lobe. LABS:  Labs Reviewed   COMPREHENSIVE METABOLIC PANEL - Abnormal; Notable for the following components:       Result Value    Sodium 119 (*)     Potassium 3.1 (*)     Chloride 75 (*)     Anion Gap 18 (*)     BUN 26 (*)     Total Bilirubin 1.8 (*)      (*)      (*)     All other components within normal limits    Narrative:     CALL  [place]  [facility] tel. [phone],  [facility] results called to and read back by Alexey VINCENT, 10/05/2021 00:05,  by [facility]   CBC WITH AUTO DIFFERENTIAL - Abnormal; Notable for the following components:    WBC 11.4 (*)     RBC 4.17 (*)     Hematocrit 40.8 (*)     MCH 35.0 (*)     Neutrophils Absolute 10.5 (*)     Lymphocytes Absolute 0.4 (*)     All other components within normal limits   ETHANOL   LIPASE   MAGNESIUM   LACTIC ACID, PLASMA   URINE DRUG SCREEN   URINE RT REFLEX TO CULTURE   TYPE AND SCREEN       All other labs were within normal range or not returned as of this dictation. Please note, any cultures that may have been sent were not resulted at the time of this patient visit.     EMERGENCY DEPARTMENT COURSE and Medical Decision Making:     Vitals:    Vitals:    10/04/21 2215 10/04/21 2230 10/04/21 2300 10/04/21 2315   BP:  116/85 139/86    Pulse: 103  110 106   Resp:       Temp:       TempSrc:       SpO2:       Weight:       Height:           PROCEDURES: (None if blank)  Procedures       MDM     Patient was found to have dark stools on exam as he did have a bowel movement on the bed. It was guaiaced and ended up being positive. His hemoglobin at this point is stable. Patient was altered upon arrival.  His ethanol level is negative. He did have a sodium of 119. Most likely due to beer protamine he is secondary to his chronic alcohol use. CTs of his head, facial bones, and C-spine were negative for any acute processes. He does have a history of a brain tumor status post resection. Patient was becoming agitated and trying to pull out his lines. We did give him 3 mg of Ativan to initially in 1 to follow. Secondary to his upper GI bleed, patient was given a Protonix bolus followed by an infusion. Patient will need to be admitted for further work-up of his hyponatremia as well as his GI bleed. Strict return precautions and follow up instructions were discussed with the patient with which the patient agrees    ED Medications administered this visit:    Medications   pantoprazole (PROTONIX) 80 mg in sodium chloride 0.9 % 50 mL bolus (has no administration in time range)   pantoprazole (PROTONIX) 80 mg in sodium chloride 0.9 % 100 mL infusion (has no administration in time range)   0.9 % sodium chloride bolus (0 mLs IntraVENous Stopped 10/5/21 0106)   LORazepam (ATIVAN) injection 1 mg (1 mg IntraVENous Given 10/4/21 2256)   LORazepam (ATIVAN) injection 2 mg (2 mg IntraVENous Given 10/4/21 2256)         FINAL IMPRESSION      1. Hyponatremia    2. Upper GI bleed    3. Altered mental status, unspecified altered mental status type          DISPOSITION/PLAN   DISPOSITION Decision To Admit 10/05/2021 12:26:10 AM      PATIENT REFERRED TO:  No follow-up provider specified.     DISCHARGE MEDICATIONS:  New Prescriptions    No medications on file              Wilda Jade DO (electronically signed)  Attending Physician, Emergency Department         Wilda Jade DO  10/05/21 Michael Cabello DO  10/05/21 2263

## 2021-10-05 NOTE — ED NOTES
Bed: 07  Expected date: 10/4/21  Expected time: 9:49 PM  Means of arrival: [facility]  Comments:  41year old male poss gi bleed  AMS  Semi combative  Gi bleed   [id number]  17  99.4  [address], RN  10/04/21 1855

## 2021-10-05 NOTE — H&P
[facility]  MEDICINE    HISTORY AND PHYSICAL EXAM    PATIENT NAME:  Yessenia Cook    MRN:  [id number]  SERVICE DATE:  10/5/2021   SERVICE TIME:  2:52 AM    Primary Care Physician: Cam López MD         SUBJECTIVE  CHIEF COMPLAINT:  AMS    HPI: Patient brought to the ER after being found with altered mental status public. Patient brought in confused with multiple abrasions and bruising throughout his body. Patient unable to provide history as far as current as well as past medical illness. Patient responds to verbal and painful stimuli. Patient gives an occasional one-word response to few questions. But offers no meaningful communication. Patient does have a past medical history of brain tumor (age 1), hydrocephalus with  shunt infections, Graves' disease, EtOH abuse, and neuroblastoma. PAST MEDICAL HISTORY:    Past Medical History:   Diagnosis Date    Brain tumor ([facility] [address]     Graves disease      PAST SURGICAL HISTORY:  No past surgical history on file. FAMILY HISTORY:  No family history on file.   SOCIAL HISTORY:    Social History     Socioeconomic History    Marital status: Single     Spouse name: Not on file    Number of children: Not on file    Years of education: Not on file    Highest education level: Not on file   Occupational History    Not on file   Tobacco Use    Smoking status: Current Every Day Smoker     Packs/day: 2.00     Types: Cigarettes    Smokeless tobacco: Never Used   Substance and Sexual Activity    Alcohol use: Yes     Comment: Every couple of days    Drug use: Yes     Types: Marijuana     Comment: ocassionally    Sexual activity: Not on file   Other Topics Concern    Not on file   Social History Narrative    Not on file     Social Determinants of Health     Financial Resource Strain:     Difficulty of Paying Living Expenses:    Food Insecurity:     Worried About Running Out of Food in the Last Year:     [address] in the Last Year: Transportation Needs:     Lack of Transportation (Medical):  Lack of Transportation (Non-Medical):    Physical Activity:     Days of Exercise per Week:     Minutes of Exercise per Session:    Stress:     Feeling of Stress :    Social Connections:     Frequency of Communication with Friends and Family:     Frequency of Social Gatherings with Friends and Family:     Attends Hinduism Services:     Active Member of Clubs or Organizations:     Attends Club or Organization Meetings:     Marital Status:    Intimate Partner Violence:     Fear of Current or Ex-Partner:     Emotionally Abused:     Physically Abused:     Sexually Abused:      MEDICATIONS:   Prior to Admission medications    Not on File       ALLERGIES: Red dye    REVIEW OF SYSTEM:   Review of Systems   Unable to perform ROS: Acuity of condition         OBJECTIVE  PHYSICAL EXAM: /86   Pulse 106   Temp 98.2 °F (36.8 °C) (Axillary)   Resp 18   Ht 5' (1.524 m)   Wt 130 lb (59 kg)   SpO2 99%   BMI 25.39 kg/m²     Physical Exam  Vitals and nursing note reviewed. Constitutional:       Appearance: He is well-developed. He is ill-appearing. HENT:      Nose: Nose normal.      Mouth/Throat:      Mouth: Mucous membranes are dry. Cardiovascular:      Rate and Rhythm: Regular rhythm. Tachycardia present. Pulses: Normal pulses. Heart sounds: Normal heart sounds. Pulmonary:      Effort: Pulmonary effort is normal. No respiratory distress. Breath sounds: Normal breath sounds. No stridor. No wheezing or rales. Abdominal:      General: Bowel sounds are normal.      Palpations: Abdomen is soft. There is no mass. Tenderness: There is no abdominal tenderness. Musculoskeletal:         General: Normal range of motion. Cervical back: Normal range of motion. Right lower leg: No edema. Left lower leg: No edema. Lymphadenopathy:      Cervical: No cervical adenopathy.    Skin:     General: Skin is warm and dry.      Capillary Refill: Capillary refill takes less than 2 seconds. Findings: Abrasion, bruising and lesion present. Comments: Multiple abrasions, lesions, and bruising on all 4 extremities and facial area. Neurological:      Mental Status: He is alert. He is disoriented and confused. Deep Tendon Reflexes: Reflexes normal.           DATA:     Diagnostic tests reviewed for today's visit:    Most recent labs and imaging results reviewed.      LABS:    Recent Results (from the past 24 hour(s))   Ethanol    Collection Time: 10/04/21 10:15 PM   Result Value Ref Range    Ethanol Lvl <10 mg/dL    Ethanol percent Not indicated G/dL   Comprehensive Metabolic Panel    Collection Time: 10/04/21 10:15 PM   Result Value Ref Range    Sodium 119 (LL) 135 - 144 mEq/L    Potassium 3.1 (L) 3.4 - 4.9 mEq/L    Chloride 75 (L) 95 - 107 mEq/L    CO2 26 20 - 31 mEq/L    Anion Gap 18 (H) 9 - 15 mEq/L    Glucose 82 70 - 99 mg/dL    BUN 26 (H) 6 - 20 mg/dL    CREATININE 0.90 0.70 - 1.20 mg/dL    GFR Non-African American >60.0 >60    GFR  >60.0 >60    Calcium 9.9 8.5 - 9.9 mg/dL    Total Protein 7.5 6.3 - 8.0 g/dL    Albumin 4.6 3.5 - 4.6 g/dL    Total Bilirubin 1.8 (H) 0.2 - 0.7 mg/dL    Alkaline Phosphatase 94 35 - 104 U/L     (H) 0 - 41 U/L     (H) 0 - 40 U/L    Globulin 2.9 2.3 - 3.5 g/dL   CBC Auto Differential    Collection Time: 10/04/21 10:15 PM   Result Value Ref Range    WBC 11.4 (H) 4.8 - 10.8 K/uL    RBC 4.17 (L) 4.70 - 6.10 M/uL    Hemoglobin 14.6 14.0 - 18.0 g/dL    Hematocrit 40.8 (L) 42.0 - 52.0 %    MCV 97.9 80.0 - 100.0 fL    MCH 35.0 (H) 27.0 - 31.3 pg    MCHC 35.8 33.0 - 37.0 %    RDW 13.2 11.5 - 14.5 %    Platelets 405 033 - 924 K/uL    Neutrophils % 92.1 %    Lymphocytes % 3.5 %    Monocytes % 4.3 %    Eosinophils % 0.0 %    Basophils % 0.1 %    Neutrophils Absolute 10.5 (H) 1.4 - 6.5 K/uL    Lymphocytes Absolute 0.4 (L) 1.0 - 4.8 K/uL    Monocytes Absolute 0.5 0.2 - 0.8 K/uL    Eosinophils Absolute 0.0 0.0 - 0.7 K/uL    Basophils Absolute 0.0 0.0 - 0.2 K/uL   Lipase    Collection Time: 10/04/21 10:15 PM   Result Value Ref Range    Lipase 21 12 - 95 U/L   Magnesium    Collection Time: 10/04/21 10:15 PM   Result Value Ref Range    Magnesium 2.3 1.7 - 2.4 mg/dL   LACTIC ACID, PLASMA    Collection Time: 10/04/21 10:30 PM   Result Value Ref Range    Lactic Acid 1.6 0.5 - 2.2 mmol/L   TYPE AND SCREEN    Collection Time: 10/04/21 10:30 PM   Result Value Ref Range    ABO/Rh O POS     Antibody Screen NEG        IMAGING:  No results found. VTE Prophylaxis: SCDs    ASSESSMENT AND PLAN  Encephalopathy: Multifactorial including hyponatremia, alcohol withdrawal, acidosis, elevated LFTs, or GI bleed, hx of infected  shunt, or possible toxic ingestion. CT of head, C-spine, facial bones, chest, abdomen negative for acute processes. Covid negative. WBCs 11.4. Lactulose for hepatic encephalopathy. We will consult GI for elevated LFTs/GI bleed. We will consult neurosurgery for history of infected  shunt. Hyponatremia: Sodium 119. Likely related to beer proteinemia. We will get urine electrolytes. We will place patient on D5 /hr. we will monitor CMP daily. Every 6 hours BMP. GI bleed: Black tarry stools in the ED and positive guaiac. Hemoglobin 14.6. We will consult GI. We will administer Protonix drip. Elevated LFT: /. We will consult GI. We will place patient on lactulose for hepatic encephalopathy. We will get liver ultrasound. We will get ammonia level. Hypokalemia: Potassium 3.1. We will administer potassium IV for goal 3.5. History of EtOH: Alcohol negative. We will place patient on CIWA protocol including thiamine multivitamin.       Plan of care discussed with: patient    SIGNATURE: LISA Isaac CNP  DATE: October 5, 2021  TIME: 2:52 AM     Dr. Alexis Flores MD - supervising physician

## 2021-10-05 NOTE — CONSULTS
Subjective:      Patient ID: Shauna Quiñones is a 41 y.o. male who presents today for falls. HPI 41 a right-handed gentleman who I was asked to see for significant falls. Patient is considered bruising all over and is likely falling. Patient had a neuroblastoma resection about 4 years ago with a  shunt. Patient has multiple revisions with malfunctioning  shunt. Patient was brought in here due to wandering around in the streets and is likely fallen multiple times. His liver function tests elevated consistent with possible underlying diagnosis of alcohol use and there appears to be multiple episodes of dark tarry stools in the ER. Patient has a considerable metabolic encephalopathy secondary to multifactorial etiology of hyponatremia though the shunt does not appear to be malfunctioning. Patient's sodium is 119 patient is sedated with [facility] protocol  Review of Systems   Unable to perform ROS: Mental status change       Past Medical History:   Diagnosis Date    Brain tumor ([facility] [address]     Graves disease      No past surgical history on file.   Social History     Socioeconomic History    Marital status: Single     Spouse name: Not on file    Number of children: Not on file    Years of education: Not on file    Highest education level: Not on file   Occupational History    Not on file   Tobacco Use    Smoking status: Current Every Day Smoker     Packs/day: 2.00     Types: Cigarettes    Smokeless tobacco: Never Used   Substance and Sexual Activity    Alcohol use: Yes     Comment: Every couple of days    Drug use: Yes     Types: Marijuana     Comment: ocassionally    Sexual activity: Not on file   Other Topics Concern    Not on file   Social History Narrative    Not on file     Social Determinants of Health     Financial Resource Strain:     Difficulty of Paying Living Expenses:    Food Insecurity:     Worried About Running Out of Food in the Last Year:     [address] in the Last Year: Transportation Needs:     Lack of Transportation (Medical):  Lack of Transportation (Non-Medical):    Physical Activity:     Days of Exercise per Week:     Minutes of Exercise per Session:    Stress:     Feeling of Stress :    Social Connections:     Frequency of Communication with Friends and Family:     Frequency of Social Gatherings with Friends and Family:     Attends Congregation Services:     Active Member of Clubs or Organizations:     Attends Club or Organization Meetings:     Marital Status:    Intimate Partner Violence:     Fear of Current or Ex-Partner:     Emotionally Abused:     Physically Abused:     Sexually Abused:      No family history on file.   Allergies   Allergen Reactions    Red Dye      Current Facility-Administered Medications   Medication Dose Route Frequency Provider Last Rate Last Admin    sodium chloride flush 0.9 % injection 5-40 mL  5-40 mL IntraVENous 2 times per day Kenrick Bella MD        sodium chloride flush 0.9 % injection 5-40 mL  5-40 mL IntraVENous PRN Kati Silverman MD        0.9 % sodium chloride infusion  25 mL IntraVENous PRN Kati Caraballo MD        ondansetron (ZOFRAN-ODT) disintegrating tablet 4 mg  4 mg Oral Q8H PRN Kati Caraballo MD        Or    ondansetron [facility]) injection 4 mg  4 mg IntraVENous Q6H PRN Kati Silverman MD        polyethylene glycol (GLYCOLAX) packet 17 g  17 g Oral Daily PRN Kati Caraballo MD        acetaminophen (TYLENOL) tablet 650 mg  650 mg Oral Q6H PRN Kati Caraballo MD        Or    acetaminophen (TYLENOL) suppository 650 mg  650 mg Rectal Q6H PRN Kati Caraballo MD        potassium chloride 10 mEq/100 mL IVPB (Peripheral Line)  10 mEq IntraVENous PRN Kati Caraballo MD        magnesium sulfate 2000 mg in 50 mL IVPB premix  2,000 mg IntraVENous PRN Kati Caraballo MD        octreotide (SANDOSTATIN) 500 mcg in sodium chloride 0.9 % 100 mL infusion  25 mcg/hr IntraVENous Continuous Kati Friend MD 5 mL/hr at 10/05/21 0550 25 mcg/hr at 10/05/21 0550    pantoprazole (PROTONIX) injection 40 mg  40 mg IntraVENous BID Gordon Bee MD        And    sodium chloride (PF) 0.9 % injection 10 mL  10 mL IntraVENous BID Kati Friend MD        cefTRIAXone (ROCEPHIN) 1000 mg IVPB in 50 mL D5W minibag  1,000 mg IntraVENous Q24H Kati Friend MD        lactulose (CHRONULAC) 10 GM/15ML solution 20 g  20 g Oral TID Kati Silverman MD        dextrose 5 % and 0.9 % sodium chloride infusion   IntraVENous Continuous Nicoletto Bolzan-Roche, APRN - CNP        sodium chloride flush 0.9 % injection 5-40 mL  5-40 mL IntraVENous 2 times per day Nicoletto Bolzan-Roche, APRN - CNP        sodium chloride flush 0.9 % injection 5-40 mL  5-40 mL IntraVENous PRN Nicoletto Bolzan-Roche, APRN - CNP        0.9 % sodium chloride infusion  25 mL IntraVENous PRN Nicoletto Bolzan-Roche, APRN - CNP        thiamine (B-1) injection 100 mg  100 mg IntraVENous Daily Nicoletto Bolzan-Roche, APRN - CNP        CENTRUM/CERTA-ANI with minerals oral solution 15 mL  15 mL Oral Daily Nicoletto Bolzan-Roche, APRN - CNP        LORazepam (ATIVAN) tablet 1 mg  1 mg Oral Q1H PRN Nicoletto Bolzan-Roche, APRN - CNP        Or    LORazepam (ATIVAN) injection 1 mg  1 mg IntraVENous Q1H PRN Nicoletto Bolzan-Roche, APRN - CNP        Or    LORazepam (ATIVAN) tablet 2 mg  2 mg Oral Q1H PRN Nicoletto Bolzan-Roche, APRN - CNP        Or    LORazepam (ATIVAN) injection 2 mg  2 mg IntraVENous Q1H PRN Nicoletto Bolzan-Roche, APRN - CNP        Or    LORazepam (ATIVAN) tablet 3 mg  3 mg Oral Q1H PRN Nicoletto Bolzan-Roche, APRN - CNP        Or    LORazepam (ATIVAN) injection 3 mg  3 mg IntraVENous Q1H PRN Nicoletto Bolzan-Roche, APRN - CNP        Or    LORazepam (ATIVAN) tablet 4 mg  4 mg Oral Q1H PRN LISA Garcia CNP        Or    LORazepam (ATIVAN) injection 4 mg  4 mg IntraVENous Q1H PRN Huyen Aguiar, APRN - CNP            Objective:   /65   Pulse 92   Temp 98.1 °F (36.7 °C)   Resp 16   Ht 5' (1.524 m)   Wt 130 lb (59 kg)   SpO2 94%   BMI 25.39 kg/m²     Physical Exam current bruising noted all over likely representing someone is falling. His neck appears to be somewhat stiff though is not relaxed and appears to be moving all his extremities to deep pain only. Pupils are equal reactive eye movements appear to be conjugate and there is no facial symmetry. He is areflexic throughout the rest of the examination today difficulty certain. Cardiovascular system S1-S2 normal no murmurs appreciated no carotid bruits  CT ABDOMEN PELVIS WO CONTRAST Additional Contrast? None    Result Date: 10/5/2021  CT of the Abdomen and Pelvis   without  intravenous contrast medium History:  Aspiration, vomiting Technical Factors: CT imaging of the abdomen and pelvis were obtained and formatted as 5 mm contiguous axial images from the domes of the diaphragm to the symphysis pubis. Sagittal and coronal reconstructions were also obtained. Oral contrast medium:  None. Intravenous contrast medium:  None. Comparison:  None. Findings: Study limited secondary to motion artifact. Lungs:  Lung bases are clear. Liver:  Normal in size, shape, and attenuation. Bile Ducts:  Normal in caliber. Gallbladder:  No stones or wall thickening. Pancreas:  Normal without masses, cysts, ductal dilatation or calcification. Spleen:  Normal in size without masses or calcifications. No splenules. Kidneys:  Normal in size and enhancement. No hydronephrosis, masses, or stones. Adrenals:  Normal. Small bowel:  Normal in caliber. Appendix:  Normal. Colon:  Normal in caliber. Peritoneum:  No ascites, free air, or fluid collections. Subcutaneous catheter identified entering at level of mid upper abdomen with catheter tip located centrally in mid pelvic inlet. Vessels:   Aorta normal in course and caliber. Portal vein, splenic vein, superior mesenteric vein are patent. Lymph nodes:  Retroperitoneal:  No enlarged retroperitoneal lymph nodes. Mesenteric:  No enlarged mesenteric lymph nodes. Pelvic: No enlarged pelvic lymph nodes. Ureters: Normal in course and caliber. No calcifications. Bladder: No wall thickening. Reproductive organs: No pelvic masses. Abdominal Wall:  No hernia identified. No diastasis of rectus musculature. No edema or masses. Bones:  No bone lesions. No degenerative changes. No post operative changes. Limited study as discussed. Ventriculoperitoneal shunt catheter tip in mid pelvis. All CT scans at this facility use dose modulation, iterative reconstruction, and/or weight based dosing when appropriate to reduce radiation dose to as low as reasonably achievable. CT HEAD WO CONTRAST    Result Date: 10/5/2021  CT HEAD WO CONTRAST, CT FACIAL BONES WO CONTRAST : 10/4/2021 CLINICAL HISTORY:  intox, jumped COMPARISON: Head CT 3/17/2021. TECHNIQUE: ROUTINE All CT scans at this facility use dose modulation, iterative reconstruction, and/or weight based dosing when appropriate to reduce radiation dose to as low as reasonably achievable. HEAD CT FINDINGS: There is no intracranial hemorrhage, mass effect, midline shift, extra-axial collection, hydrocephalus, evidence of a recent ischemic infarct or acute skull fracture identified. A left frontal ventriculostomy shunt, right occipital cranioplasty, suboccipital craniectomy, and encephalomalacia of the right cerebellar hemisphere appears unchanged. FACE CT FINDINGS: There is no fracture, paranasal sinus fluid, or significant posttraumatic soft tissue complication identified. The optic globes, orbits, temporomandibular joints and mandible are intact. FINAL IMPRESSION: NO ACUTE INTRACRANIAL PROCESS, ACUTE FRACTURE, OR SIGNIFICANT POSTTRAUMATIC COMPLICATION IDENTIFIED. CHRONIC FINDINGS, AS NOTED.      CT FACIAL BONES WO CONTRAST    Result Date: 10/5/2021  CT HEAD WO CONTRAST, CT FACIAL BONES WO CONTRAST : 10/4/2021 CLINICAL HISTORY:  intox, jumped COMPARISON: Head CT 3/17/2021. TECHNIQUE: ROUTINE All CT scans at this facility use dose modulation, iterative reconstruction, and/or weight based dosing when appropriate to reduce radiation dose to as low as reasonably achievable. HEAD CT FINDINGS: There is no intracranial hemorrhage, mass effect, midline shift, extra-axial collection, hydrocephalus, evidence of a recent ischemic infarct or acute skull fracture identified. A left frontal ventriculostomy shunt, right occipital cranioplasty, suboccipital craniectomy, and encephalomalacia of the right cerebellar hemisphere appears unchanged. FACE CT FINDINGS: There is no fracture, paranasal sinus fluid, or significant posttraumatic soft tissue complication identified. The optic globes, orbits, temporomandibular joints and mandible are intact. FINAL IMPRESSION: NO ACUTE INTRACRANIAL PROCESS, ACUTE FRACTURE, OR SIGNIFICANT POSTTRAUMATIC COMPLICATION IDENTIFIED. CHRONIC FINDINGS, AS NOTED. CT CERVICAL SPINE WO CONTRAST    Result Date: 10/5/2021  CT cervical spine without intravenous contrast medium. HISTORY: jumped, intoxicated. Communicating hydrocephalus with ventriculoperitoneal shunt. History meningioma and neuroblastoma. TECHNICAL FACTORS: CT cervical spine obtained and formatted as 2.5 mm contiguous axial images from skull base to the level of. Sagittal and coronal reconstructions were obtained during postprocessing. No contrast medium was utilized. COMPARISON: None FINDINGS: Cervical vertebral bodies are normal in height and alignment. Atlantooccipital articulation maintained. Atlantoaxial interval preserved. Remote suboccipital midline craniotomy. Neural foramina intact. Disc spaces preserved. No fractures, dislocations, bone lesions. Limited imaging lung apices without anomaly.  Carotid arteries and soft tissues are without anomaly. Shunt tubing identified along left head and neck. No fracture. Remote suboccipital craniotomy. Left ventriculoperitoneal shunt. All CT scans at this facility use dose modulation, iterative reconstruction, and/or weight based dosing when appropriate to reduce radiation dose to as low as reasonably achievable. Lab Results   Component Value Date    WBC 11.4 10/04/2021    RBC 4.17 10/04/2021    HGB 14.6 10/04/2021    HCT 40.8 10/04/2021    MCV 97.9 10/04/2021    MCH 35.0 10/04/2021    MCHC 35.8 10/04/2021    RDW 13.2 10/04/2021     10/04/2021     Lab Results   Component Value Date     10/04/2021    K 3.1 10/04/2021    CL 75 10/04/2021    CO2 26 10/04/2021    BUN 26 10/04/2021    CREATININE 0.90 10/04/2021    GFRAA >60.0 10/04/2021    LABGLOM >60.0 10/04/2021    GLUCOSE 82 10/04/2021    PROT 7.5 10/04/2021    LABALBU 4.6 10/04/2021    CALCIUM 9.9 10/04/2021    BILITOT 1.8 10/04/2021    ALKPHOS 94 10/04/2021     10/04/2021     10/04/2021     Lab Results   Component Value Date    PROTIME 12.8 10/05/2021    INR 1.0 10/05/2021     No results found for: TSH, TMNUOSNN26, FOLATE, FERRITIN, IRON, TIBC, PTRFSAT, TSH, FREET4  No results found for: TRIG, HDL, LDLCALC, LDLDIRECT, LABVLDL  Lab Results   Component Value Date    ETOH <10 10/04/2021     No results found for: LITHIUM, DILFRTOT, VALPROATE    Assessment:   [place] encephalopathy with hyponatremia and falls. Patient does have CT scan evidence of resection of the tumor in the cerebellum likely representing the gait issues. The shunt does not appear to be malfunctioning given that he has no hydrocephalus this is likely a shunt that was created at the time of his neuroblastoma evaluation and may have had hydrocephalus at that time. We will though continue to observe him for a shunt infection though unlikely.   Patient has multiple other issues at this time to be solved before considering the diagnoses given that patient is encephalopathic from hyponatremia and likely also has underlying GI bleeding. We will continue to observe him and will consider further evaluation after his metabolic interventions are better. In the event that he does have underlying fevers which are unexplained we will consider lumbar puncture. Patient does not appear to be in any seizure activity but status epilepticus cannot be ruled out which could be convulsive and will arrange for an EEG tomorrow. Pablito Mendoza MD, Ann Thornton, American Board of Psychiatry & Neurology  Board Certified in Vascular Neurology  Board Certified in Neuromuscular Medicine  Certified in [address] [place] [address] Drive:
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
## 2021-10-06 LAB
ALBUMIN SERPL-MCNC: 3.6 G/DL (ref 3.5–4.6)
ALP BLD-CCNC: 71 U/L (ref 35–104)
ALT SERPL-CCNC: 128 U/L (ref 0–41)
ANION GAP SERPL CALCULATED.3IONS-SCNC: 10 MEQ/L (ref 9–15)
ANION GAP SERPL CALCULATED.3IONS-SCNC: 12 MEQ/L (ref 9–15)
ANION GAP SERPL CALCULATED.3IONS-SCNC: 12 MEQ/L (ref 9–15)
AST SERPL-CCNC: 349 U/L (ref 0–40)
BASOPHILS ABSOLUTE: 0 K/UL (ref 0–0.2)
BASOPHILS RELATIVE PERCENT: 0.2 %
BILIRUB SERPL-MCNC: 1.3 MG/DL (ref 0.2–0.7)
BUN BLDV-MCNC: 10 MG/DL (ref 6–20)
BUN BLDV-MCNC: 5 MG/DL (ref 6–20)
BUN BLDV-MCNC: 7 MG/DL (ref 6–20)
CALCIUM SERPL-MCNC: 7.9 MG/DL (ref 8.5–9.9)
CALCIUM SERPL-MCNC: 8.2 MG/DL (ref 8.5–9.9)
CALCIUM SERPL-MCNC: 8.4 MG/DL (ref 8.5–9.9)
CHLORIDE BLD-SCNC: 93 MEQ/L (ref 95–107)
CHLORIDE BLD-SCNC: 94 MEQ/L (ref 95–107)
CHLORIDE BLD-SCNC: 98 MEQ/L (ref 95–107)
CO2: 22 MEQ/L (ref 20–31)
CO2: 22 MEQ/L (ref 20–31)
CO2: 23 MEQ/L (ref 20–31)
CREAT SERPL-MCNC: 0.53 MG/DL (ref 0.7–1.2)
CREAT SERPL-MCNC: 0.6 MG/DL (ref 0.7–1.2)
CREAT SERPL-MCNC: 0.69 MG/DL (ref 0.7–1.2)
EOSINOPHILS ABSOLUTE: 0 K/UL (ref 0–0.7)
EOSINOPHILS RELATIVE PERCENT: 0.8 %
GFR AFRICAN AMERICAN: >60
GFR NON-AFRICAN AMERICAN: >60
GLOBULIN: 2.5 G/DL (ref 2.3–3.5)
GLUCOSE BLD-MCNC: 122 MG/DL (ref 70–99)
GLUCOSE BLD-MCNC: 122 MG/DL (ref 70–99)
GLUCOSE BLD-MCNC: 130 MG/DL (ref 70–99)
HCT VFR BLD CALC: 37.8 % (ref 42–52)
HEMOGLOBIN: 13.2 G/DL (ref 14–18)
LYMPHOCYTES ABSOLUTE: 0.4 K/UL (ref 1–4.8)
LYMPHOCYTES RELATIVE PERCENT: 7.6 %
MAGNESIUM: 2.1 MG/DL (ref 1.7–2.4)
MAGNESIUM: 2.2 MG/DL (ref 1.7–2.4)
MCH RBC QN AUTO: 35.2 PG (ref 27–31.3)
MCHC RBC AUTO-ENTMCNC: 35.1 % (ref 33–37)
MCV RBC AUTO: 100.5 FL (ref 80–100)
MONOCYTES ABSOLUTE: 0.5 K/UL (ref 0.2–0.8)
MONOCYTES RELATIVE PERCENT: 8.8 %
NEUTROPHILS ABSOLUTE: 4.7 K/UL (ref 1.4–6.5)
NEUTROPHILS RELATIVE PERCENT: 82.6 %
PDW BLD-RTO: 13.2 % (ref 11.5–14.5)
PLATELET # BLD: 179 K/UL (ref 130–400)
POTASSIUM REFLEX MAGNESIUM: 3.1 MEQ/L (ref 3.4–4.9)
POTASSIUM REFLEX MAGNESIUM: 3.3 MEQ/L (ref 3.4–4.9)
POTASSIUM REFLEX MAGNESIUM: 3.8 MEQ/L (ref 3.4–4.9)
RBC # BLD: 3.76 M/UL (ref 4.7–6.1)
SODIUM BLD-SCNC: 126 MEQ/L (ref 135–144)
SODIUM BLD-SCNC: 127 MEQ/L (ref 135–144)
SODIUM BLD-SCNC: 133 MEQ/L (ref 135–144)
TOTAL PROTEIN: 6.1 G/DL (ref 6.3–8)
WBC # BLD: 5.7 K/UL (ref 4.8–10.8)

## 2021-10-06 PROCEDURE — 83735 ASSAY OF MAGNESIUM: CPT

## 2021-10-06 PROCEDURE — 99223 1ST HOSP IP/OBS HIGH 75: CPT | Performed by: NEUROLOGICAL SURGERY

## 2021-10-06 PROCEDURE — 6370000000 HC RX 637 (ALT 250 FOR IP): Performed by: INTERNAL MEDICINE

## 2021-10-06 PROCEDURE — 6360000002 HC RX W HCPCS: Performed by: NURSE PRACTITIONER

## 2021-10-06 PROCEDURE — 6360000002 HC RX W HCPCS: Performed by: INTERNAL MEDICINE

## 2021-10-06 PROCEDURE — 2580000003 HC RX 258: Performed by: NURSE PRACTITIONER

## 2021-10-06 PROCEDURE — APPSS30 APP SPLIT SHARED TIME 16-30 MINUTES: Performed by: NURSE PRACTITIONER

## 2021-10-06 PROCEDURE — 2580000003 HC RX 258: Performed by: INTERNAL MEDICINE

## 2021-10-06 PROCEDURE — 80053 COMPREHEN METABOLIC PANEL: CPT

## 2021-10-06 PROCEDURE — 85025 COMPLETE CBC W/AUTO DIFF WBC: CPT

## 2021-10-06 PROCEDURE — 99232 SBSQ HOSP IP/OBS MODERATE 35: CPT | Performed by: INTERNAL MEDICINE

## 2021-10-06 PROCEDURE — 99233 SBSQ HOSP IP/OBS HIGH 50: CPT | Performed by: PSYCHIATRY & NEUROLOGY

## 2021-10-06 PROCEDURE — C9113 INJ PANTOPRAZOLE SODIUM, VIA: HCPCS | Performed by: INTERNAL MEDICINE

## 2021-10-06 PROCEDURE — 6370000000 HC RX 637 (ALT 250 FOR IP): Performed by: NURSE PRACTITIONER

## 2021-10-06 PROCEDURE — 36415 COLL VENOUS BLD VENIPUNCTURE: CPT

## 2021-10-06 PROCEDURE — 1210000000 HC MED SURG R&B

## 2021-10-06 RX ADMIN — DEXTROSE AND SODIUM CHLORIDE: 5; 900 INJECTION, SOLUTION INTRAVENOUS at 05:05

## 2021-10-06 RX ADMIN — POTASSIUM CHLORIDE 10 MEQ: 7.46 INJECTION, SOLUTION INTRAVENOUS at 02:21

## 2021-10-06 RX ADMIN — LORAZEPAM 2 MG: 2 INJECTION INTRAMUSCULAR; INTRAVENOUS at 22:56

## 2021-10-06 RX ADMIN — THIAMINE HYDROCHLORIDE 100 MG: 100 INJECTION, SOLUTION INTRAMUSCULAR; INTRAVENOUS at 09:55

## 2021-10-06 RX ADMIN — Medication 10 ML: at 09:56

## 2021-10-06 RX ADMIN — CEFTRIAXONE SODIUM 1000 MG: 1 INJECTION, POWDER, FOR SOLUTION INTRAMUSCULAR; INTRAVENOUS at 05:01

## 2021-10-06 RX ADMIN — POTASSIUM CHLORIDE 10 MEQ: 7.46 INJECTION, SOLUTION INTRAVENOUS at 03:22

## 2021-10-06 RX ADMIN — Medication 10 ML: at 10:00

## 2021-10-06 RX ADMIN — SODIUM CHLORIDE, PRESERVATIVE FREE 10 ML: 5 INJECTION INTRAVENOUS at 22:56

## 2021-10-06 RX ADMIN — PANTOPRAZOLE SODIUM 40 MG: 40 INJECTION, POWDER, FOR SOLUTION INTRAVENOUS at 10:00

## 2021-10-06 RX ADMIN — Medication 10 ML: at 22:57

## 2021-10-06 RX ADMIN — LACTULOSE 20 G: 20 SOLUTION ORAL at 13:31

## 2021-10-06 RX ADMIN — LACTULOSE 20 G: 20 SOLUTION ORAL at 22:56

## 2021-10-06 RX ADMIN — LACTULOSE 20 G: 20 SOLUTION ORAL at 09:55

## 2021-10-06 RX ADMIN — PANTOPRAZOLE SODIUM 40 MG: 40 INJECTION, POWDER, FOR SOLUTION INTRAVENOUS at 22:56

## 2021-10-06 RX ADMIN — POTASSIUM CHLORIDE 10 MEQ: 7.46 INJECTION, SOLUTION INTRAVENOUS at 01:19

## 2021-10-06 RX ADMIN — Medication 15 ML: at 09:55

## 2021-10-06 RX ADMIN — SODIUM CHLORIDE, PRESERVATIVE FREE 10 ML: 5 INJECTION INTRAVENOUS at 10:00

## 2021-10-06 ASSESSMENT — ENCOUNTER SYMPTOMS
SHORTNESS OF BREATH: 0
ABDOMINAL PAIN: 0
VOMITING: 0
COUGH: 0
TROUBLE SWALLOWING: 0
NAUSEA: 0

## 2021-10-06 ASSESSMENT — PAIN SCALES - WONG BAKER
WONGBAKER_NUMERICALRESPONSE: 0

## 2021-10-06 NOTE — PROGRESS NOTES
Pt shift assessment completed. Pt is alert & oriented x4. He has been sleeping all shift but very easy to awake and answer questions. Pt in BL wrist restraints but still managing to pull off tele monitor and brief. Pt has been incontinent since arrival and has had 1 black tarry BM on my shift so far. Water given with meds and offered often. He has no other requests at this time. Will continue to monitor.      Electronically signed by Lary Randle RN on 10/6/2021 at 12:22 AM

## 2021-10-06 NOTE — PROGRESS NOTES
Progress Note  Patient: Alessandra Espinosa  Unit/Bed: X098/G940-76  YOB: 1980  MRN: [id number]  Acct: [de-identified]   Admitting Diagnosis: Hyponatremia [E87.1]  Upper GI bleed [K92.2]  Altered mental status, unspecified altered mental status type [R41.82]  Date:  10/4/2021  Hospital Day: 1    Chief Complaint:  Altered mental status    Subjective  Patient has no complaints at this time. Physical Examination:    BP (!) 138/94   Pulse 102   Temp 98.2 °F (36.8 °C) (Oral)   Resp 24   Ht 5' (1.524 m)   Wt 130 lb (59 kg)   SpO2 100%   BMI 25.39 kg/m²    Physical Exam       Patient is more awake today however continues to be disoriented. He is alert to person and disoriented to place and time. He believes he is at my house and is not easily reoriented. He has multiple abrasion and scratch marks to his face with the left side of his face being more edematous than the right with orbital swelling. He is able to see from both eyes however does complain of blurry vision reporting his vision is always blurry. He denies any headache.      LABS:  CBC:   Lab Results   Component Value Date    WBC 5.7 10/06/2021    RBC 3.76 10/06/2021    HGB 13.2 10/06/2021    HCT 37.8 10/06/2021    .5 10/06/2021    MCH 35.2 10/06/2021    MCHC 35.1 10/06/2021    RDW 13.2 10/06/2021     10/06/2021     CBC with Differential:   Lab Results   Component Value Date    WBC 5.7 10/06/2021    RBC 3.76 10/06/2021    HGB 13.2 10/06/2021    HCT 37.8 10/06/2021     10/06/2021    .5 10/06/2021    MCH 35.2 10/06/2021    MCHC 35.1 10/06/2021    RDW 13.2 10/06/2021    LYMPHOPCT 7.6 10/06/2021    MONOPCT 8.8 10/06/2021    BASOPCT 0.2 10/06/2021    MONOSABS 0.5 10/06/2021    LYMPHSABS 0.4 10/06/2021    EOSABS 0.0 10/06/2021    BASOSABS 0.0 10/06/2021     CMP:    Lab Results   Component Value Date     10/06/2021    K 3.3 10/06/2021    CL 94 10/06/2021    CO2 22 10/06/2021    BUN 7 10/06/2021    CREATININE 0.60 10/06/2021    GFRAA >60.0 10/06/2021    LABGLOM >60.0 10/06/2021    GLUCOSE 122 10/06/2021    PROT 6.1 10/06/2021    LABALBU 3.6 10/06/2021    CALCIUM 7.9 10/06/2021    BILITOT 1.3 10/06/2021    ALKPHOS 71 10/06/2021     10/06/2021     10/06/2021     BMP:    Lab Results   Component Value Date     10/06/2021    K 3.3 10/06/2021    CL 94 10/06/2021    CO2 22 10/06/2021    BUN 7 10/06/2021    LABALBU 3.6 10/06/2021    CREATININE 0.60 10/06/2021    CALCIUM 7.9 10/06/2021    GFRAA >60.0 10/06/2021    LABGLOM >60.0 10/06/2021    GLUCOSE 122 10/06/2021     Magnesium:    Lab Results   Component Value Date    MG 2.1 10/06/2021     Troponin:  No results found for: TROPONINI        Assessment:    Active Hospital Problems    Diagnosis Date Noted    Hyponatremia [E87.1] 10/05/2021    GI bleed [K92.2] 10/05/2021    Elevated LFTs [R79.89] 10/05/2021    Hypokalemia [E87.6] 10/05/2021    Encephalopathy [G93.40] 10/05/2021     Patient is more alert today with improvement in his hyponatremia. No complaints of headache. Will continue to follow patient.       Electronically signedby Neville Hernandez, APRN - CNP on 10/6/2021 at 11:09 AM

## 2021-10-06 NOTE — PROGRESS NOTES
Gastroenterology Progress Note      Oren Gomez   Hospitalization Day:1    Chief C/o: Upper GI bleed    SUBJECTIVE: Patient had a few black/tarry stools overnight. Per day shift RN, he had a couple of stools this afternoon, normal/brown. He denies nausea/vomiting, hematemesis, or hematochezia. Patient much more alert/oriented this morning as compared to yesterday. He does report drinking, \" a few tall boys per day. He also endorses taking ibuprofen 800 mg daily for headaches. Physical    VITALS:  BP (!) 138/94   Pulse 102   Temp 98.2 °F (36.8 °C) (Oral)   Resp 24   Ht 5' (1.524 m)   Wt 130 lb (59 kg)   SpO2 100%   BMI 25.39 kg/m²   TEMPERATURE:  Current - Temp: 98.2 °F (36.8 °C); Max - Temp  Av.2 °F (36.8 °C)  Min: 98.2 °F (36.8 °C)  Max: 98.2 °F (36.8 °C)    General-alert, oriented, in no acute distress  Eyes- anicteric sclera, no pallor  Cardiovascular- RRR withtout murmur  Lungs- clear to auscultation bilaterally  Abdomen soft, nondistended, nontender, Bowel sounds normal   Extremities- without edema  Skin- without jaundice, abrasion on the right upper cheek/eye    Data      Recent Labs     10/04/21  2215 10/06/21  0320   WBC 11.4* 5.7   HGB 14.6 13.2*   HCT 40.8* 37.8*   MCV 97.9 100.5*    179     Recent Labs     10/05/21  1806 10/06/21  0320 10/06/21  0800   * 127* 126*   K 2.9* 3.8 3.3*   CL 87* 93* 94*   CO2 22 22 22   BUN 18 10 7   CREATININE 0.70 0.69* 0.60*     Recent Labs     10/04/21  2215 10/06/21  0320   * 349*   * 128*   BILITOT 1.8* 1.3*   ALKPHOS 94 71     Recent Labs     10/04/21  2215   LIPASE 21     Recent Labs     10/05/21  0302   PROTIME 12.8   INR 1.0       ASSESSMENT :  41 y.o. male with PMH neuroblastoma (@1 years old s/p resection/ shunt and multiple revisions), Graves disease, chronic alcohol abuse, admitted with a history of acute metabolic encephalopathy. Of note, patient's HPI is limited due to patient's mental status/encephalopathy. He was found to have dark stools in the ER, guaiac positive. Per nursing, no further bowel movements since admission to hospital floor yesterday. Of note, on 3/17/2021, Hgb 16.6, .5, platelets 909, albumin 4.7, ALT 62, AST 25, ethanol level 187. On admit, WBCs 11.4, Hgb 14.6, MCV 97.9, platelets 093, sodium 119, potassium 3.1, BUN 26, creatinine 0.90, albumin 4.6, bilirubin 1.8, alkaline phosphatase 94, , , INR 1.0, ethanol level less than 10.    10/5/21, ammonia level was 12. Today Hgb 13.2,  , , total bilirubin 1.3    Impression:  Suspect acute alcoholic hepatitis in the setting of alcohol. No clinical or radiological evidence for cirrhosis. LFTs trending down since admission. Withdrawal/hyponatremia/encephalopathy. Dark stools in the ER raise suspicion for upper GIB (?gastritis/PUD) in the setting of alcohol use. Additional risk factors include daily NSAID use. Also in differential remains encephalopathy due to shunt infection, although less likely as patient has had no fevers, WBCs minimally elevated, CT head/abdomen reveals shunt in place/unchanged. PLAN:  -Discussed with patient recommendation of EtOH cessation. Rec EtOH abuse counseling as outpt  - Banana bag, thiamine, folate, trace elements.  Monitor for withdrawal  - Nutritionist consult; rec multiple feedings, emphasizing breakfast and a nighttime snack, with a regular oral diet at 1.2-1.5 g of protein/kg/day and 35-40 silva/kg/day  -Monitor electrolytes and replete as needed  -Recommend r/o infectious causes: UA with reflex culture, blood cultures, obtain CXR, neurology to consider lumbar puncture (hx of  shunt)  -Monitor LFTs and INR daily   - IV PPI - 40mg IV twice daily  - Monitor H/H and transfuse accordingly  -Monitor all stools for color/consistency  -Clear liquid diet for now  - Continue IVF for hypotension  - Avoid NSAID's  -Will consider endoscopic evaluation if further signs/symptoms of bleeding recur.    Thank you for allowing me to participate in the care of your patient.   Feel free to contact me with any questions or concerns    Naz Coombs MD

## 2021-10-06 NOTE — PROGRESS NOTES
Physician Progress Note    10/6/2021   6:07 PM    Name:  Candy Velasco  MRN:    [id number]      Day: 1     Admit Date: 10/4/2021 10:00 PM  PCP: Savita Brownlee MD    Code Status:  Full Code    Subjective:     He is able to tell me his name and that he is in hospital but he does not know where he is or what month it is. He otherwise denies complaints at this time.     Current Facility-Administered Medications   Medication Dose Route Frequency Provider Last Rate Last Admin    sodium chloride flush 0.9 % injection 5-40 mL  5-40 mL IntraVENous 2 times per day Abdifatah Jovel MD   10 mL at 10/06/21 1000    sodium chloride flush 0.9 % injection 5-40 mL  5-40 mL IntraVENous PRN Kati Silverman MD        0.9 % sodium chloride infusion  25 mL IntraVENous PRN Kati Silverman MD        ondansetron (ZOFRAN-ODT) disintegrating tablet 4 mg  4 mg Oral Q8H PRN Kati Silverman MD        Or    [facility]dansetron [facility]) injection 4 mg  4 mg IntraVENous Q6H PRN Kati Silverman MD        polyethylene glycol (GLYCOLAX) packet 17 g  17 g Oral Daily PRN Kati Larios MD        acetaminophen (TYLENOL) tablet 650 mg  650 mg Oral Q6H PRN Kati Larios MD        Or    acetaminophen (TYLENOL) suppository 650 mg  650 mg Rectal Q6H PRN Kati Silverman MD        potassium chloride 10 mEq/100 mL IVPB (Peripheral Line)  10 mEq IntraVENous PRN Kati Silverman  mL/hr at 10/06/21 0322 10 mEq at 10/06/21 0322    magnesium sulfate 2000 mg in 50 mL IVPB premix  2,000 mg IntraVENous PRN Kati Silverman MD        pantoprazole (PROTONIX) injection 40 mg  40 mg IntraVENous BID Kati Silverman MD   40 mg at 10/06/21 1000    And    sodium chloride (PF) 0.9 % injection 10 mL  10 mL IntraVENous BID Kati Silverman MD   10 mL at 10/06/21 1000    cefTRIAXone (ROCEPHIN) 1000 mg IVPB in 50 mL D5W minibag  1,000 mg IntraVENous Q24H Abdifatah Jovel MD Stopped at 10/06/21 0531    lactulose (CHRONULAC) 10 GM/15ML solution 20 g  20 g Oral TID [facility] Carmen Hair MD   20 g at 10/06/21 1331    dextrose 5 % and 0.9 % sodium chloride infusion   IntraVENous Continuous Nicoletto Bolzan-Roche, APRN -  mL/hr at 10/06/21 0505 New Bag at 10/06/21 0505    sodium chloride flush 0.9 % injection 5-40 mL  5-40 mL IntraVENous 2 times per day Nicoletto Bolzan-Roche, APRN - CNP   10 mL at 10/06/21 0956    sodium chloride flush 0.9 % injection 5-40 mL  5-40 mL IntraVENous PRN Nicoletto Bolzan-Roche, APRN - CNP        0.9 % sodium chloride infusion  25 mL IntraVENous PRN Nicoletto Bolzan-Roche, APRN - CNP        thiamine (B-1) injection 100 mg  100 mg IntraVENous Daily Nicoletto Bolzan-Roche, APRN - CNP   100 mg at 10/06/21 3948    CENTRUM/CERTA-ANI with minerals oral solution 15 mL  15 mL Oral Daily Nicoletto Bolzan-Roche, APRN - CNP   15 mL at 10/06/21 0955    LORazepam (ATIVAN) tablet 1 mg  1 mg Oral Q1H PRN Nicoletto Bolzan-Roche, APRN - CNP        Or    LORazepam (ATIVAN) injection 1 mg  1 mg IntraVENous Q1H PRN Nicoletto Bolzan-Roche, APRN - CNP        Or    LORazepam (ATIVAN) tablet 2 mg  2 mg Oral Q1H PRN Nicoletto Bolzan-Roche, APRN - CNP        Or    LORazepam (ATIVAN) injection 2 mg  2 mg IntraVENous Q1H PRN Nicoletto Bolzan-Roche, APRN - CNP        Or    LORazepam (ATIVAN) tablet 3 mg  3 mg Oral Q1H PRN Nicoletto Bolzan-Roche, APRN - CNP        Or    LORazepam (ATIVAN) injection 3 mg  3 mg IntraVENous Q1H PRN Nicoletto Bolzan-Roche, APRN - CNP        Or    LORazepam (ATIVAN) tablet 4 mg  4 mg Oral Q1H PRN Nicoletto Bolzan-Roche, APRN - CNP        Or    LORazepam (ATIVAN) injection 4 mg  4 mg IntraVENous Q1H PRN Nicoletto Bolzan-Roche, APRN - CNP           Physical Examination:      Vitals:  BP (!) 138/94   Pulse 102   Temp 98.2 °F (36.8 °C) (Oral)   Resp 24   Ht 5' (1.524 m)   Wt 130 lb (59 kg)   SpO2 100%   BMI 25.39 kg/m²   Temp (24hrs), Av.2 °F (36.8 °C), Min:98.2 °F (36.8 °C), Max:98.2 °F (36.8 °C)      General appearance: Thin, malnourished, and chronically ill-appearing. Appears much older than stated age. Oriented to person but not time or place. He does know he is in the hospital.  He can answer simple yes/no questions but is not capable of complex discussion  Lungs: clear to auscultation bilaterally, normal effort  Heart: regular rate and rhythm, no murmur  Abdomen: soft, nontender, nondistended, bowel sounds present, no masses  Extremities: no edema, redness, tenderness in the calves. Cap refill <2s  Skin: no gross lesions, rashes    Data:     Labs:  Recent Labs     10/04/21  2215 10/06/21  0320   WBC 11.4* 5.7   HGB 14.6 13.2*    179     Recent Labs     10/06/21  0800 10/06/21  1500   * 133*   K 3.3* 3.1*   CL 94* 98   CO2 22 23   BUN 7 5*   CREATININE 0.60* 0.53*   GLUCOSE 122* 122*     Recent Labs     10/04/21  2215 10/06/21  0320   * 349*   * 128*   BILITOT 1.8* 1.3*   ALKPHOS 94 71       Assessment and Plan:        1. Metabolic, toxic encephalopathy secondary to hypovolemic hyponatremia and alcohol use disorder  -Correct hyponatremia-already improved with D5 NS-stop IV fluid  -Alcoholic ketoacidosis treated-anion gap improved  -Empiric lactulose for 2 BM per day  -CIWA protocol with as needed benzodiazepine  -On empiric ceftriaxone. No obvious infection identified  -EEG pending. Neurology following  -Appreciate neurosurgery evaluation- shunt is functioning. No signs of shunt infection    2. Tarry stool: Monitor H&H.  PPI for now. GI following     Diet: ADULT DIET; Clear Liquid  Full Code    Dispo: Inpatient.   Add PT/OT    >35 minutes in total care time    Electronically signed by Marcos Christie DO on 10/6/2021 at 6:07 PM

## 2021-10-06 NOTE — PROGRESS NOTES
[facility] Daily Progress Note  Name: Vivienne Alas  Age: 41 y.o. Gender: male  CodeStatus: Full Code  Allergies: Red Dye    Chief Complaint:Altered Mental Status    Primary Care Provider: Yobani Morfin MD  InpatientTreatment Team: Treatment Team: Attending Provider: Malachi Farias DO; Consulting Physician: Kami Ozuna; Consulting Physician: Malachi Farias DO; Utilization Reviewer: Doug Rosenthal RN; Consulting Physician: Claudia Blanchard MD; : Carolyn Pate RN; Consulting Physician: Cathy Quintero MD; Registered Nurse: Raymundo Ring RN  Admission Date: 10/4/2021      HPI   Pt seen and examined for neuro follow up for metabolic encephalopathy in the setting of hyponatremia and ETOH abuse. Currently A&O x1-2, NAD. Off precedex gtt. No ativan today. Clear liquid diet. Nonfocal. No seizures reported. Afebrile. NO Headache, double vision, blurry vision, difficulty with speech, difficulty with swallowing, weakness, numbness, pain, nausea, vomiting, choking, neck pain, dizziness  No new changes    Vitals:    10/05/21 1937   BP: (!) 138/94   Pulse: 102   Resp: 24   Temp: 98.2 °F (36.8 °C)   SpO2: 100%      Review of Systems   Unable to perform ROS: Mental status change   Constitutional: Positive for appetite change and fatigue. HENT: Negative for trouble swallowing. Eyes: Negative for visual disturbance. Respiratory: Negative for cough and shortness of breath. Cardiovascular: Negative for leg swelling. Gastrointestinal: Negative for abdominal pain, nausea and vomiting. Genitourinary: Negative for difficulty urinating. Neurological: Negative for dizziness, tremors, seizures, syncope, facial asymmetry, speech difficulty, weakness, light-headedness, numbness and headaches. Psychiatric/Behavioral: Positive for behavioral problems and confusion. Negative for hallucinations. Physical Exam  Vitals and nursing note reviewed.    Constitutional:       General: He is not in acute distress. Appearance: He is not ill-appearing or diaphoretic. HENT:      Head: Normocephalic and atraumatic. Eyes:      General: No visual field deficit. Extraocular Movements: Extraocular movements intact. Pupils: Pupils are equal, round, and reactive to light. Cardiovascular:      Rate and Rhythm: Normal rate and regular rhythm. Pulmonary:      Effort: Pulmonary effort is normal. No respiratory distress. Breath sounds: Normal breath sounds. Abdominal:      General: Bowel sounds are normal.      Palpations: Abdomen is soft. Skin:     General: Skin is warm and dry. Neurological:      General: No focal deficit present. Mental Status: He is alert. He is disoriented. Cranial Nerves: No cranial nerve deficit, dysarthria or facial asymmetry. Motor: No weakness, tremor, atrophy, abnormal muscle tone, seizure activity or pronator drift.       Coordination: Coordination normal.               Medications:  Reviewed    Infusion Medications:    sodium chloride      dextrose 5 % and 0.9 % NaCl 100 mL/hr at 10/06/21 0505    sodium chloride       Scheduled Medications:    sodium chloride flush  5-40 mL IntraVENous 2 times per day    pantoprazole  40 mg IntraVENous BID    And    sodium chloride (PF)  10 mL IntraVENous BID    cefTRIAXone (ROCEPHIN) IV  1,000 mg IntraVENous Q24H    lactulose  20 g Oral TID    sodium chloride flush  5-40 mL IntraVENous 2 times per day    thiamine  100 mg IntraVENous Daily    CENTRUM/CERTA-ANI with minerals oral  15 mL Oral Daily     PRN Meds: sodium chloride flush, sodium chloride, ondansetron **OR** ondansetron, polyethylene glycol, acetaminophen **OR** acetaminophen, potassium chloride, magnesium sulfate, sodium chloride flush, sodium chloride, LORazepam **OR** LORazepam **OR** LORazepam **OR** LORazepam **OR** LORazepam **OR** LORazepam **OR** LORazepam **OR** LORazepam    Labs:   Recent Labs     10/04/21  2215 10/06/21  0320   WBC 11.4* 5.7 HGB 14.6 13.2*   HCT 40.8* 37.8*    179     Recent Labs     10/05/21  1806 10/06/21  0320 10/06/21  0800   * 127* 126*   K 2.9* 3.8 3.3*   CL 87* 93* 94*   CO2 22 22 22   BUN 18 10 7   CREATININE 0.70 0.69* 0.60*   CALCIUM 8.5 8.4* 7.9*     Recent Labs     10/04/21  2215 10/06/21  0320   * 349*   * 128*   BILITOT 1.8* 1.3*   ALKPHOS 94 71     Recent Labs     10/05/21  0302   INR 1.0     No results for input(s): CKTOTAL, TROPONINI in the last 72 hours. Urinalysis:   No results found for: Floyde Aiden, BACTERIA, RBCUA, BLOODU, [address], [address] [place] [address]    Radiology:   Most recent    EEG No valid procedures specified. MRI of Brain No results found for this or any previous visit. No results found for this or any previous visit. MRA of the Head and Neck: No results found for this or any previous visit. No results found for this or any previous visit. No results found for this or any previous visit. CT of the Head: Results for orders placed during the hospital encounter of 10/04/21    CT HEAD WO CONTRAST    Narrative  CT HEAD WO CONTRAST, CT FACIAL BONES WO CONTRAST : 10/4/2021    CLINICAL HISTORY:  intox, jumped    COMPARISON: Head CT 3/17/2021. TECHNIQUE: ROUTINE    All CT scans at this facility use dose modulation, iterative reconstruction, and/or weight based dosing when appropriate to reduce radiation dose to as low as reasonably achievable. HEAD CT FINDINGS:    There is no intracranial hemorrhage, mass effect, midline shift, extra-axial collection, hydrocephalus, evidence of a recent ischemic infarct or acute skull fracture identified. A left frontal ventriculostomy shunt, right occipital cranioplasty, suboccipital craniectomy, and encephalomalacia of the right cerebellar hemisphere appears unchanged.       FACE CT FINDINGS:    There is no fracture, paranasal sinus fluid, or significant posttraumatic soft tissue complication identified. The optic globes, orbits, temporomandibular joints and mandible are intact. Impression  FINAL IMPRESSION:    NO ACUTE INTRACRANIAL PROCESS, ACUTE FRACTURE, OR SIGNIFICANT POSTTRAUMATIC COMPLICATION IDENTIFIED. CHRONIC FINDINGS, AS NOTED. No results found for this or any previous visit. No results found for this or any previous visit. Carotid duplex: No results found for this or any previous visit. No results found for this or any previous visit. No results found for this or any previous visit. Echo No results found for this or any previous visit. Assessment/Plan:    [place] encephalopathy with hyponatremia and falls. Patient does have CT scan evidence of resection of the tumor in the cerebellum likely representing the gait issues. The shunt does not appear to be malfunctioning given that he has no hydrocephalus this is likely a shunt that was created at the time of his neuroblastoma evaluation and may have had hydrocephalus at that time. We will though continue to observe him for a shunt infection though unlikely. Patient has multiple other issues at this time to be solved before considering the diagnoses given that patient is encephalopathic from hyponatremia and likely also has underlying GI bleeding. We will continue to observe him and will consider further evaluation after his metabolic interventions are better. In the event that he does have underlying fevers which are unexplained we will consider lumbar puncture. Patient does not appear to be in any seizure activity but status epilepticus cannot be ruled out which could be convulsive and will arrange for an EEG tomorrow. Metabolic encephalopathy, slowly improving  ETOH abuse  HX craniotomy with  Neuroblastoma s/p shunt. No hydrocephalus currently. Shunt functioning. No signs shunt infection.   Hyponatremia improving  EEG pending  Thiamine  [facility] protocol  Will follow    I have personally performed a face to face diagnostic evaluation on this patient, reviewed all data and investigations, and am the sole provider of all clinical decisions on the neurological status of this patient. As noted no new changes are seen. Patient has a shunt which likely appears to be functioning at this time. Pablito Vera MD, Izabella Leija, American Board of Psychiatry & Neurology  Board Certified in Vascular Neurology  Board Certified in Neuromuscular Medicine  Certified in Neurorehabilitation       Collaborating physicians: Dr Horacio Vera    Electronically signed by LISA Collins CNP on 10/6/2021 at 3:03 PM
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
## 2021-10-07 LAB
ALBUMIN SERPL-MCNC: 3.1 G/DL (ref 3.5–4.6)
ALP BLD-CCNC: 72 U/L (ref 35–104)
ALT SERPL-CCNC: 95 U/L (ref 0–41)
ANION GAP SERPL CALCULATED.3IONS-SCNC: 10 MEQ/L (ref 9–15)
ANION GAP SERPL CALCULATED.3IONS-SCNC: 15 MEQ/L (ref 9–15)
AST SERPL-CCNC: 157 U/L (ref 0–40)
BILIRUB SERPL-MCNC: 0.5 MG/DL (ref 0.2–0.7)
BILIRUBIN DIRECT: <0.2 MG/DL (ref 0–0.4)
BILIRUBIN, INDIRECT: ABNORMAL MG/DL (ref 0–0.6)
BUN BLDV-MCNC: 4 MG/DL (ref 6–20)
BUN BLDV-MCNC: 5 MG/DL (ref 6–20)
CALCIUM SERPL-MCNC: 8.3 MG/DL (ref 8.5–9.9)
CALCIUM SERPL-MCNC: 8.4 MG/DL (ref 8.5–9.9)
CHLORIDE BLD-SCNC: 96 MEQ/L (ref 95–107)
CHLORIDE BLD-SCNC: 99 MEQ/L (ref 95–107)
CO2: 20 MEQ/L (ref 20–31)
CO2: 22 MEQ/L (ref 20–31)
CREAT SERPL-MCNC: 0.6 MG/DL (ref 0.7–1.2)
CREAT SERPL-MCNC: 0.69 MG/DL (ref 0.7–1.2)
FOLATE: 9.7 NG/ML (ref 7.3–26.1)
GFR AFRICAN AMERICAN: >60
GFR AFRICAN AMERICAN: >60
GFR NON-AFRICAN AMERICAN: >60
GFR NON-AFRICAN AMERICAN: >60
GLUCOSE BLD-MCNC: 161 MG/DL (ref 70–99)
GLUCOSE BLD-MCNC: 94 MG/DL (ref 70–99)
HCT VFR BLD CALC: 38.8 % (ref 42–52)
HEMOGLOBIN: 13.6 G/DL (ref 14–18)
MAGNESIUM: 2.1 MG/DL (ref 1.7–2.4)
MAGNESIUM: 2.3 MG/DL (ref 1.7–2.4)
MCH RBC QN AUTO: 35.8 PG (ref 27–31.3)
MCHC RBC AUTO-ENTMCNC: 35.1 % (ref 33–37)
MCV RBC AUTO: 102 FL (ref 80–100)
OSMOLALITY: 252 MOSM/KG (ref 280–303)
PDW BLD-RTO: 12.9 % (ref 11.5–14.5)
PLATELET # BLD: 149 K/UL (ref 130–400)
POTASSIUM REFLEX MAGNESIUM: 2.8 MEQ/L (ref 3.4–4.9)
POTASSIUM REFLEX MAGNESIUM: 2.8 MEQ/L (ref 3.4–4.9)
RBC # BLD: 3.81 M/UL (ref 4.7–6.1)
SODIUM BLD-SCNC: 131 MEQ/L (ref 135–144)
SODIUM BLD-SCNC: 131 MEQ/L (ref 135–144)
TOTAL PROTEIN: 5.2 G/DL (ref 6.3–8)
VITAMIN B-12: 1274 PG/ML (ref 232–1245)
WBC # BLD: 5.4 K/UL (ref 4.8–10.8)

## 2021-10-07 PROCEDURE — 2500000003 HC RX 250 WO HCPCS: Performed by: INTERNAL MEDICINE

## 2021-10-07 PROCEDURE — 2580000003 HC RX 258: Performed by: NURSE PRACTITIONER

## 2021-10-07 PROCEDURE — 99232 SBSQ HOSP IP/OBS MODERATE 35: CPT | Performed by: INTERNAL MEDICINE

## 2021-10-07 PROCEDURE — 2580000003 HC RX 258

## 2021-10-07 PROCEDURE — 6370000000 HC RX 637 (ALT 250 FOR IP): Performed by: INTERNAL MEDICINE

## 2021-10-07 PROCEDURE — 95816 EEG AWAKE AND DROWSY: CPT

## 2021-10-07 PROCEDURE — 1210000000 HC MED SURG R&B

## 2021-10-07 PROCEDURE — 6370000000 HC RX 637 (ALT 250 FOR IP): Performed by: NURSE PRACTITIONER

## 2021-10-07 PROCEDURE — 6360000002 HC RX W HCPCS: Performed by: INTERNAL MEDICINE

## 2021-10-07 PROCEDURE — APPSS15 APP SPLIT SHARED TIME 0-15 MINUTES: Performed by: NURSE PRACTITIONER

## 2021-10-07 PROCEDURE — C9113 INJ PANTOPRAZOLE SODIUM, VIA: HCPCS | Performed by: INTERNAL MEDICINE

## 2021-10-07 PROCEDURE — 2580000003 HC RX 258: Performed by: INTERNAL MEDICINE

## 2021-10-07 PROCEDURE — 80048 BASIC METABOLIC PNL TOTAL CA: CPT

## 2021-10-07 PROCEDURE — 82607 VITAMIN B-12: CPT

## 2021-10-07 PROCEDURE — 80076 HEPATIC FUNCTION PANEL: CPT

## 2021-10-07 PROCEDURE — 85027 COMPLETE CBC AUTOMATED: CPT

## 2021-10-07 PROCEDURE — 6360000002 HC RX W HCPCS: Performed by: NURSE PRACTITIONER

## 2021-10-07 PROCEDURE — 99233 SBSQ HOSP IP/OBS HIGH 50: CPT | Performed by: PSYCHIATRY & NEUROLOGY

## 2021-10-07 PROCEDURE — 82746 ASSAY OF FOLIC ACID SERUM: CPT

## 2021-10-07 PROCEDURE — 83735 ASSAY OF MAGNESIUM: CPT

## 2021-10-07 PROCEDURE — 36415 COLL VENOUS BLD VENIPUNCTURE: CPT

## 2021-10-07 RX ORDER — SODIUM CHLORIDE 9 MG/ML
INJECTION, SOLUTION INTRAVENOUS
Status: COMPLETED
Start: 2021-10-07 | End: 2021-10-07

## 2021-10-07 RX ORDER — DEXTROSE, SODIUM CHLORIDE, AND POTASSIUM CHLORIDE 5; .9; .15 G/100ML; G/100ML; G/100ML
INJECTION INTRAVENOUS CONTINUOUS
Status: DISPENSED | OUTPATIENT
Start: 2021-10-07 | End: 2021-10-08

## 2021-10-07 RX ORDER — POTASSIUM CHLORIDE 20 MEQ/1
40 TABLET, EXTENDED RELEASE ORAL EVERY 4 HOURS
Status: COMPLETED | OUTPATIENT
Start: 2021-10-07 | End: 2021-10-07

## 2021-10-07 RX ORDER — LANOLIN ALCOHOL/MO/W.PET/CERES
100 CREAM (GRAM) TOPICAL DAILY
Status: DISCONTINUED | OUTPATIENT
Start: 2021-10-08 | End: 2021-10-08 | Stop reason: HOSPADM

## 2021-10-07 RX ORDER — PANTOPRAZOLE SODIUM 40 MG/1
40 TABLET, DELAYED RELEASE ORAL
Status: DISCONTINUED | OUTPATIENT
Start: 2021-10-08 | End: 2021-10-08 | Stop reason: HOSPADM

## 2021-10-07 RX ORDER — LACTULOSE 10 G/15ML
20 SOLUTION ORAL DAILY
Status: DISCONTINUED | OUTPATIENT
Start: 2021-10-08 | End: 2021-10-08 | Stop reason: HOSPADM

## 2021-10-07 RX ADMIN — POTASSIUM CHLORIDE 10 MEQ: 7.46 INJECTION, SOLUTION INTRAVENOUS at 09:21

## 2021-10-07 RX ADMIN — SODIUM CHLORIDE, PRESERVATIVE FREE 10 ML: 5 INJECTION INTRAVENOUS at 09:21

## 2021-10-07 RX ADMIN — POTASSIUM CHLORIDE 40 MEQ: 1500 TABLET, EXTENDED RELEASE ORAL at 16:00

## 2021-10-07 RX ADMIN — CEFTRIAXONE SODIUM 1000 MG: 1 INJECTION, POWDER, FOR SOLUTION INTRAMUSCULAR; INTRAVENOUS at 04:41

## 2021-10-07 RX ADMIN — Medication 15 ML: at 09:21

## 2021-10-07 RX ADMIN — Medication 10 ML: at 09:21

## 2021-10-07 RX ADMIN — POTASSIUM CHLORIDE 40 MEQ: 1500 TABLET, EXTENDED RELEASE ORAL at 19:33

## 2021-10-07 RX ADMIN — POTASSIUM CHLORIDE 10 MEQ: 7.46 INJECTION, SOLUTION INTRAVENOUS at 06:15

## 2021-10-07 RX ADMIN — THIAMINE HYDROCHLORIDE 100 MG: 100 INJECTION, SOLUTION INTRAMUSCULAR; INTRAVENOUS at 09:21

## 2021-10-07 RX ADMIN — PANTOPRAZOLE SODIUM 40 MG: 40 INJECTION, POWDER, FOR SOLUTION INTRAVENOUS at 09:21

## 2021-10-07 RX ADMIN — POTASSIUM CHLORIDE, DEXTROSE MONOHYDRATE AND SODIUM CHLORIDE: 150; 5; 900 INJECTION, SOLUTION INTRAVENOUS at 20:06

## 2021-10-07 RX ADMIN — POTASSIUM CHLORIDE 10 MEQ: 7.46 INJECTION, SOLUTION INTRAVENOUS at 07:45

## 2021-10-07 RX ADMIN — Medication 5 ML: at 23:26

## 2021-10-07 RX ADMIN — POTASSIUM CHLORIDE 40 MEQ: 1500 TABLET, EXTENDED RELEASE ORAL at 11:16

## 2021-10-07 RX ADMIN — SODIUM CHLORIDE 500 ML: 9 INJECTION, SOLUTION INTRAVENOUS at 06:15

## 2021-10-07 RX ADMIN — LACTULOSE 20 G: 20 SOLUTION ORAL at 09:21

## 2021-10-07 ASSESSMENT — ENCOUNTER SYMPTOMS
COUGH: 0
VOMITING: 0
ABDOMINAL PAIN: 0
SHORTNESS OF BREATH: 0
TROUBLE SWALLOWING: 0
NAUSEA: 0

## 2021-10-07 ASSESSMENT — PAIN SCALES - GENERAL: PAINLEVEL_OUTOF10: 0

## 2021-10-07 NOTE — PROGRESS NOTES
Physician Progress Note    10/7/2021   5:28 PM    Name:  Graceann Dubin  MRN:    [id number]      Day: 2     Admit Date: 10/4/2021 10:00 PM  PCP: Matthew Bocanegra MD    Code Status:  Full Code    Subjective:     He feels very tired but denies any complaints at this time.     Current Facility-Administered Medications   Medication Dose Route Frequency Provider Last Rate Last Admin    potassium chloride (KLOR-CON M) extended release tablet 40 mEq  40 mEq Oral Q4H Joshua CEDRIC WattsJermaine, DO   40 mEq at 10/07/21 1600    [START ON 10/8/2021] lactulose (CHRONULAC) 10 GM/15ML solution 20 g  20 g Oral Daily Hasskylee Smart, DO        [START ON 10/8/2021] pantoprazole (PROTONIX) tablet 40 mg  40 mg Oral QAM AC Joshua R Jermaine, DO        [START ON 10/8/2021] thiamine tablet 100 mg  100 mg Oral Daily Hasskylee Smart, DO        sodium chloride flush 0.9 % injection 5-40 mL  5-40 mL IntraVENous 2 times per day Gualberto Stack MD   10 mL at 10/07/21 0921    sodium chloride flush 0.9 % injection 5-40 mL  5-40 mL IntraVENous PRN Kati Silverman MD        0.9 % sodium chloride infusion  25 mL IntraVENous PRN Kati Silverman MD        ondansetron (ZOFRAN-ODT) disintegrating tablet 4 mg  4 mg Oral Q8H PRN Kati Silverman MD        Or    ondansetron [facility]) injection 4 mg  4 mg IntraVENous Q6H PRN Kati Silverman MD        polyethylene glycol (GLYCOLAX) packet 17 g  17 g Oral Daily PRN Kati Silverman MD        acetaminophen (TYLENOL) tablet 650 mg  650 mg Oral Q6H PRN Kati Silverman MD        Or    acetaminophen (TYLENOL) suppository 650 mg  650 mg Rectal Q6H PRN Kati Silverman MD        potassium chloride 10 mEq/100 mL IVPB (Peripheral Line)  10 mEq IntraVENous PRN Kati Galvin  mL/hr at 10/07/21 0921 10 mEq at 10/07/21 0921    magnesium sulfate 2000 mg in 50 mL IVPB premix  2,000 mg IntraVENous PRN Gualberto Stack MD        sodium chloride (PF) 0.9 % injection 10 mL  10 mL IntraVENous BID Kati Church MD   10 mL at 10/07/21 09    cefTRIAXone (ROCEPHIN) 1000 mg IVPB in 50 mL D5W minibag  1,000 mg IntraVENous Q24H Yossi Malik MD   Stopped at 10/07/21 0511    sodium chloride flush 0.9 % injection 5-40 mL  5-40 mL IntraVENous 2 times per day Nicoletto Bolzan-Roche, APRN - CNP   10 mL at 10/07/21 0921    sodium chloride flush 0.9 % injection 5-40 mL  5-40 mL IntraVENous PRN Nicoletto Bolzan-Roche, APRN - CNP        0.9 % sodium chloride infusion  25 mL IntraVENous PRN Nicoletto Bolzan-Roche, APRN - CNP        CENTRUM/CERTA-ANI with minerals oral solution 15 mL  15 mL Oral Daily Nicoletto Bolzan-Roche, APRN - CNP   15 mL at 10/07/21 0921    LORazepam (ATIVAN) tablet 1 mg  1 mg Oral Q1H PRN Nicoletto Bolzan-Roche, APRN - CNP        Or    LORazepam (ATIVAN) injection 1 mg  1 mg IntraVENous Q1H PRN Nicoletto Bolzan-Roche, APRN - CNP        Or    LORazepam (ATIVAN) tablet 2 mg  2 mg Oral Q1H PRN Nicoletto Bolzan-Roche, APRN - CNP        Or    LORazepam (ATIVAN) injection 2 mg  2 mg IntraVENous Q1H PRN Nicoletto Bolzan-Roche, APRN - CNP   2 mg at 10/06/21 2256    Or    LORazepam (ATIVAN) tablet 3 mg  3 mg Oral Q1H PRN Nicoletto Bolzan-Roche, APRN - CNP        Or    LORazepam (ATIVAN) injection 3 mg  3 mg IntraVENous Q1H PRN Nicoletto Bolzan-Roche, APRN - CNP        Or    LORazepam (ATIVAN) tablet 4 mg  4 mg Oral Q1H PRN Nicoletto Bolzan-Roche, APRN - CNP        Or    LORazepam (ATIVAN) injection 4 mg  4 mg IntraVENous Q1H PRN Nicoletto Bolzan-Roche, APRN - CNP           Physical Examination:      Vitals:  /67   Pulse 104   Temp 98.8 °F (37.1 °C) (Oral)   Resp 16   Ht 5' (1.524 m)   Wt 130 lb (59 kg)   SpO2 100%   BMI 25.39 kg/m²   Temp (24hrs), Av.8 °F (37.1 °C), Min:98.8 °F (37.1 °C), Max:98.8 °F (37.1 °C)      General appearance: Thin, malnourished, and chronically ill-appearing.   Appears much older than stated age. Oriented to person but not time or place. He does know he is in the hospital.  He can answer simple yes/no questions but is not capable of complex discussion  Lungs: clear to auscultation bilaterally, normal effort  Heart: regular rate and rhythm, no murmur  Abdomen: soft, nontender, nondistended, bowel sounds present, no masses  Extremities: no edema, redness, tenderness in the calves. Cap refill <2s  Skin: no gross lesions, rashes    Data:     Labs:  Recent Labs     10/06/21  0320 10/07/21  0934   WBC 5.7 5.4   HGB 13.2* 13.6*    149     Recent Labs     10/07/21  0323 10/07/21  0934   * 131*   K 2.8* 2.8*   CL 99 96   CO2 22 20   BUN 4* 5*   CREATININE 0.60* 0.69*   GLUCOSE 94 161*     Recent Labs     10/06/21  0320 10/07/21  0934   * 157*   * 95*   BILITOT 1.3* 0.5   ALKPHOS 71 72       Assessment and Plan:        1. Metabolic, toxic encephalopathy secondary to hypovolemic hyponatremia and alcohol use disorder  -Hyponatremia improved-continue gentle D5 NS overnight  -Alcoholic ketoacidosis treated-anion gap improved  -Empiric lactulose for 2 BM per day  -CIWA protocol with as needed benzodiazepine  -On empiric ceftriaxone. No obvious infection identified. Will discontinue antibiotics at discharge  -EEG pending. Neurology following  -Appreciate neurosurgery evaluation- shunt is functioning. No signs of shunt infection  -Mobilize with PT/OT    2. Tarry stool: H&H stable. Transition to oral PPI     Diet: ADULT DIET;  Clear Liquid  Full Code    PT/OT    >35 minutes in total care time    Electronically signed by Malachi Farias DO on 10/7/2021 at 5:28 PM

## 2021-10-07 NOTE — PROGRESS NOTES
[facility] Neurology Daily Progress Note  Name: Carline Dixon  Age: 41 y.o. Gender: male  CodeStatus: Full Code  Allergies: Red Dye    Chief Complaint:Altered Mental Status    Primary Care Provider: Guadalupe Braga MD  InpatientTreatment Team: Treatment Team: Attending Provider: Jimmy Roberts DO; Consulting Physician: Adam Sampson; Consulting Physician: Jimmy Roberts DO; Utilization Reviewer: Dav Mackay RN; Consulting Physician: Ebony Rico MD; Consulting Physician: Will Avendano MD; : Sylwia Maldonado RN; Nursing Student: Ericka Davis; Registered Nurse: Eboni Liu RN  Admission Date: 10/4/2021         Pt seen and examined for neuro follow up for metabolic encephalopathy in the setting of hyponatremia and ETOH abuse. Currently A&O x1-2, NAD. Off precedex gtt. Clear liquid diet. Nonfocal. No seizures reported. Afebrile. Required Ativan overnight. EEG report pending. No behavioral disturbances today. No seizures     Vitals:    10/07/21 0804   BP: 112/67   Pulse: 104   Resp: 16   Temp: 98.8 °F (37.1 °C)   SpO2: 100%      Review of Systems   Unable to perform ROS: Mental status change   Constitutional: Positive for appetite change and fatigue. HENT: Negative for trouble swallowing. Eyes: Negative for visual disturbance. Respiratory: Negative for cough and shortness of breath. Cardiovascular: Negative for leg swelling. Gastrointestinal: Negative for abdominal pain, nausea and vomiting. Genitourinary: Negative for difficulty urinating. Neurological: Negative for dizziness, tremors, seizures, syncope, facial asymmetry, speech difficulty, weakness, light-headedness, numbness and headaches. Psychiatric/Behavioral: Positive for behavioral problems and confusion. Negative for hallucinations. Physical Exam  Vitals and nursing note reviewed. Constitutional:       General: He is not in acute distress. Appearance: He is not ill-appearing or diaphoretic.    HENT: Head: Normocephalic and atraumatic. Eyes:      General: No visual field deficit. Extraocular Movements: Extraocular movements intact. Pupils: Pupils are equal, round, and reactive to light. Cardiovascular:      Rate and Rhythm: Normal rate and regular rhythm. Pulmonary:      Effort: Pulmonary effort is normal. No respiratory distress. Breath sounds: Normal breath sounds. Abdominal:      General: Bowel sounds are normal.      Palpations: Abdomen is soft. Skin:     General: Skin is warm and dry. Neurological:      General: No focal deficit present. Mental Status: He is alert. He is disoriented. Cranial Nerves: No cranial nerve deficit, dysarthria or facial asymmetry. Motor: No weakness, tremor, atrophy, abnormal muscle tone, seizure activity or pronator drift.       Coordination: Coordination normal.               Medications:  Reviewed    Infusion Medications:    sodium chloride      sodium chloride       Scheduled Medications:    potassium chloride  40 mEq Oral Q4H    [START ON 10/8/2021] lactulose  20 g Oral Daily    sodium chloride flush  5-40 mL IntraVENous 2 times per day    pantoprazole  40 mg IntraVENous BID    And    sodium chloride (PF)  10 mL IntraVENous BID    cefTRIAXone (ROCEPHIN) IV  1,000 mg IntraVENous Q24H    sodium chloride flush  5-40 mL IntraVENous 2 times per day    thiamine  100 mg IntraVENous Daily    CENTRUM/CERTA-ANI with minerals oral  15 mL Oral Daily     PRN Meds: sodium chloride flush, sodium chloride, ondansetron **OR** ondansetron, polyethylene glycol, acetaminophen **OR** acetaminophen, potassium chloride, magnesium sulfate, sodium chloride flush, sodium chloride, LORazepam **OR** LORazepam **OR** LORazepam **OR** LORazepam **OR** LORazepam **OR** LORazepam **OR** LORazepam **OR** LORazepam    Labs:   Recent Labs     10/04/21  2215 10/06/21  0320 10/07/21  0934   WBC 11.4* 5.7 5.4   HGB 14.6 13.2* 13.6*   HCT 40.8* 37.8* 38.8*    179 149     Recent Labs     10/06/21  1500 10/07/21  0323 10/07/21  0934   * 131* 131*   K 3.1* 2.8* 2.8*   CL 98 99 96   CO2 23 22 20   BUN 5* 4* 5*   CREATININE 0.53* 0.60* 0.69*   CALCIUM 8.2* 8.4* 8.3*     Recent Labs     10/04/21  2215 10/06/21  0320 10/07/21  0934   * 349* 157*   * 128* 95*   BILIDIR  --   --  <0.2   BILITOT 1.8* 1.3* 0.5   ALKPHOS 94 71 72     Recent Labs     10/05/21  0302   INR 1.0     No results for input(s): Erendirafilemon Brooks in the last 72 hours. Urinalysis:   No results found for: Kacy Harada, BACTERIA, RBCUA, BLOODU, [address], [address]    Radiology:   Most recent    EEG No valid procedures specified. MRI of Brain No results found for this or any previous visit. No results found for this or any previous visit. MRA of the Head and Neck: No results found for this or any previous visit. No results found for this or any previous visit. No results found for this or any previous visit. CT of the Head: Results for orders placed during the hospital encounter of 10/04/21    CT HEAD WO CONTRAST    Narrative  CT HEAD WO CONTRAST, CT FACIAL BONES WO CONTRAST : 10/4/2021    CLINICAL HISTORY:  intox, jumped    COMPARISON: Head CT 3/17/2021. TECHNIQUE: ROUTINE    All CT scans at this facility use dose modulation, iterative reconstruction, and/or weight based dosing when appropriate to reduce radiation dose to as low as reasonably achievable. HEAD CT FINDINGS:    There is no intracranial hemorrhage, mass effect, midline shift, extra-axial collection, hydrocephalus, evidence of a recent ischemic infarct or acute skull fracture identified. A left frontal ventriculostomy shunt, right occipital cranioplasty, suboccipital craniectomy, and encephalomalacia of the right cerebellar hemisphere appears unchanged.       FACE CT FINDINGS:    There is no fracture, paranasal sinus fluid, or significant posttraumatic soft tissue complication identified. The optic globes, orbits, temporomandibular joints and mandible are intact. Impression  FINAL IMPRESSION:    NO ACUTE INTRACRANIAL PROCESS, ACUTE FRACTURE, OR SIGNIFICANT POSTTRAUMATIC COMPLICATION IDENTIFIED. CHRONIC FINDINGS, AS NOTED. No results found for this or any previous visit. No results found for this or any previous visit. Carotid duplex: No results found for this or any previous visit. No results found for this or any previous visit. No results found for this or any previous visit. Echo No results found for this or any previous visit. Assessment/Plan:    B[place]ick encephalopathy with hyponatremia and falls. Patient does have CT scan evidence of resection of the tumor in the cerebellum likely representing the gait issues. The shunt does not appear to be malfunctioning given that he has no hydrocephalus this is likely a shunt that was created at the time of his neuroblastoma evaluation and may have had hydrocephalus at that time. We will though continue to observe him for a shunt infection though unlikely. Patient has multiple other issues at this time to be solved before considering the diagnoses given that patient is encephalopathic from hyponatremia and likely also has underlying GI bleeding. We will continue to observe him and will consider further evaluation after his metabolic interventions are better. In the event that he does have underlying fevers which are unexplained we will consider lumbar puncture. Patient does not appear to be in any seizure activity but status epilepticus cannot be ruled out which could be convulsive and will arrange for an EEG tomorrow. Metabolic encephalopathy, slowly improving  ETOH abuse  HX craniotomy with  Neuroblastoma s/p shunt. No hydrocephalus currently. Shunt functioning. No signs shunt infection.   Hyponatremia improving  EEG pending  Thiamine  [facility] protocol  Will follow   As noted no new changes are seen. Patient has a shunt which likely appears to be functioning at this time. Encephalopathy continues to improve. Patient remains on CIWA protocol  Hyponatremia persists mildly  We will continue to follow  Okay to DC from neurology standpoint once medically cleared    I have personally performed a face to face diagnostic evaluation on this patient, reviewed all data and investigations, and am the sole provider of all clinical decisions on the neurological status of this patient.events noted, encephalopathy better, nonfocal      [facility] ROBERT Gil MD, [address] American Board of Psychiatry & Neurology  Board Certified in Vascular Neurology  Board Certified in Neuromuscular Medicine  Certified in Neurorehabilitation     Collaborating physicians: Dr Jose Gil        Electronically signed by LISA Jean CNP on 10/7/2021 at 3:51 PM

## 2021-10-07 NOTE — PROGRESS NOTES
Gastroenterology Progress Note      Roxana [place]   Hospitalization Day:2    Chief C/o: Upper GI bleed    SUBJECTIVE: Per nursing inpatient no more black/tarry stools overnight. He denies nausea/vomiting, hematemesis or hematochezia. Hgb stable at 13.6. Physical    VITALS:  /67   Pulse 104   Temp 98.8 °F (37.1 °C) (Oral)   Resp 16   Ht 5' (1.524 m)   Wt 130 lb (59 kg)   SpO2 100%   BMI 25.39 kg/m²   TEMPERATURE:  Current - Temp: 98.8 °F (37.1 °C); Max - Temp  Av.8 °F (37.1 °C)  Min: 98.8 °F (37.1 °C)  Max: 98.8 °F (37.1 °C)    General-alert, oriented, in no acute distress  Eyes- anicteric sclera, no pallor  Cardiovascular- RRR withtout murmur  Lungs- clear to auscultation bilaterally  Abdomen soft, nondistended, nontender, Bowel sounds normal   Extremities- without edema  Skin- without jaundice    Data      Recent Labs     10/04/21  2215 10/06/21  0320 10/07/21  0934   WBC 11.4* 5.7 5.4   HGB 14.6 13.2* 13.6*   HCT 40.8* 37.8* 38.8*   MCV 97.9 100.5* 102.0*    179 149     Recent Labs     10/06/21  1500 10/07/21  0323 10/07/21  0934   * 131* 131*   K 3.1* 2.8* 2.8*   CL 98 99 96   CO2 23 22 20   BUN 5* 4* 5*   CREATININE 0.53* 0.60* 0.69*     Recent Labs     10/04/21  2215 10/06/21  0320 10/07/21  0934   * 349* 157*   * 128* 95*   BILIDIR  --   --  <0.2   BILITOT 1.8* 1.3* 0.5   ALKPHOS 94 71 72     Recent Labs     10/04/21  2215   LIPASE 21     Recent Labs     10/05/21  0302   PROTIME 12.8   INR 1.0     ASSESSMENT :  41 y. o. male with [email or web address] resection/ shunt and multiple revisions), Graves disease, chronic alcohol abuse, admitted with a history of acute metabolic encephalopathy. Of note, patient's HPI is limited due to patient's mental status/encephalopathy.  He was found to have dark stools in the ER, guaiac positive.  Per nursing, no further bowel movements since admission to hospital floor yesterday.   Of note, on 3/17/2021, Hgb 16.6, .5, platelets 353, JAAHJTD 4.7, ALT 62, AST 25, ethanol level 187. On admit, WBCs 11.4, Hgb 14.6, MCV 97.9, platelets 503, sodium 119, potassium 3.1, BUN 26, creatinine 0.90, albumin 4.6, bilirubin 1.8, alkaline phosphatase 94, , , INR 1.0, ethanol level less than 10.    10/5/21, ammonia level was 12.  10/6/21, Hgb 13.2,  , , total bilirubin 1.3  Today , ALT 95, Hgb stable at 13.6. Impression:  Suspect acute alcoholic hepatitis in the setting of alcohol.  No clinical or radiological evidence for cirrhosis. LFTs trending down since admission. Withdrawal/hyponatremia/encephalopathy. Dark stools in the ER raise suspicion for upper GIB (?gastritis/PUD) in the setting of alcohol use. Additional risk factors include daily NSAID use. Also in differential remains encephalopathy due to shunt infection, although less likely as patient has had no fevers, WBCs minimally elevated, CT head/abdomen reveals shunt in place/unchanged.     PLAN:  -Discussed with patient recommendation of EtOH cessation. Rec EtOH abuse counseling as outpt  - Banana bag, thiamine, folate, trace elements. Monitor for withdrawal  - Nutritionist consult; rec multiple feedings, emphasizing breakfast and a nighttime snack, with a regular oral diet at 1.2-1.5 g of protein/kg/day and 35-40 silva/kg/day  -Monitor electrolytes and replete as needed  -Recommend r/o infectious causes: UA with reflex culture, blood cultures, obtain CXR, neurology to consider lumbar puncture (hx of  shunt)  -Monitor LFTs and INR daily   - IV PPI - 40mg IV twice daily  - Monitor H/H and transfuse accordingly  -Monitor all stools for color/consistency  -Clear liquid diet for now  - Continue IVF for hypotension  - Avoid NSAID's  -GI to sign off    Thank you for allowing me to participate in the care of your patient.   Feel free to contact me with any questions or concerns    Pramod Edwards MD

## 2021-10-07 NOTE — PROGRESS NOTES
Shift assessment complete. Patient A&Ox4. VSS. Restraints discontinued patient calm, cooperative and was asleep majority of the shift. He awakens easily and answers questions appropriately. Call light in reach. Will continue to monitor.

## 2021-10-07 NOTE — CARE COORDINATION
MET WITH PATIENT. HE IS RESISTANT TO DISCUSS CMI, BUT PARTICIPATES WITH ENCOURAGEMENT. DENIES NEEDS FOR DC OTHER THAN A RIDE HOME. PT STATES HE DOES NOT KNOW HIS MOTHER'S PHONE NUMBER. HE REQUESTS THAT THIS CM CONTACT HIS EMPLOYER [employer] IN [place] TO NOTIFY THEM OF HIS HOSPITALIZATION. THIS CM SPOKE WITH LAUREN, MANAGER AT [employer]. LAUREN STATES THAT PT HASN'T BEEN TO WORK. THIS CM CONFIRMED PATIENT HAS BEEN IN [address] [place] [address] 10/4. LAUREN STATES THEY HAVE NO EMERGENCY CONTACT FOR PT.     PT DECLINES ETOH REHAB ASSISTANCE OR LETS GET REAL. [facility] [place] Case Management Initial Discharge Assessment    Met with Patient to discuss discharge plan. PCP: Edmar Elizondo MD                                Date of Last Visit: \"A COUPLE OF MTHS AGO\"    If no PCP, list provided? N/A    Discharge Planning    Living Arrangements: independently at home    Who do you live with? MOTHER AND STEPFATHER  SMILEY & Ez De La Cruz    Who helps you with your care:  self    If lives at home:     Do you have any barriers navigating in your home? no    Patient can perform ADL? Yes    Current Services (outpatient and in home) :  None    Dialysis: No    Is transportation available to get to your appointments? Yes, USUALLY GETS A RIDE    DME Equipment:  no    Respiratory equipment: None    Respiratory provider:  no     Pharmacy:  yes - [facility] [place] [place] with Medication Assistance Program?  No      Patient agreeable to [facility]? Declined    Patient agreeable to SNF/Rehab? Declined    Other discharge needs identified? N/A    Does Patient Have a High-Risk for Readmission Diagnosis (CHF, PN, MI, COPD)? No  Initial Discharge Plan? (Note: please see concurrent daily documentation for any updates after initial note). HOME W/FAMILY. PT STATES HE DRINKS 2 BEERS DAILY. PT STATES HE WILL NEED TRANSPORTATION ASSISTANCE AT DC.      Readmission Risk              Risk of Unplanned Readmission:  12 Electronically signed by Sylwia Maldonado RN on 10/7/2021 at 8:12 AM

## 2021-10-08 VITALS
HEIGHT: 60 IN | TEMPERATURE: 99 F | HEART RATE: 88 BPM | WEIGHT: 130 LBS | BODY MASS INDEX: 25.52 KG/M2 | RESPIRATION RATE: 17 BRPM | SYSTOLIC BLOOD PRESSURE: 91 MMHG | DIASTOLIC BLOOD PRESSURE: 53 MMHG | OXYGEN SATURATION: 97 %

## 2021-10-08 LAB
ALBUMIN SERPL-MCNC: 3.4 G/DL (ref 3.5–4.6)
ALP BLD-CCNC: 70 U/L (ref 35–104)
ALT SERPL-CCNC: 89 U/L (ref 0–41)
ANION GAP SERPL CALCULATED.3IONS-SCNC: 10 MEQ/L (ref 9–15)
AST SERPL-CCNC: 105 U/L (ref 0–40)
BASOPHILS ABSOLUTE: 0 K/UL (ref 0–0.2)
BASOPHILS RELATIVE PERCENT: 0.8 %
BILIRUB SERPL-MCNC: 0.4 MG/DL (ref 0.2–0.7)
BILIRUBIN DIRECT: <0.2 MG/DL (ref 0–0.4)
BILIRUBIN, INDIRECT: ABNORMAL MG/DL (ref 0–0.6)
BUN BLDV-MCNC: 4 MG/DL (ref 6–20)
CALCIUM SERPL-MCNC: 8.7 MG/DL (ref 8.5–9.9)
CHLORIDE BLD-SCNC: 98 MEQ/L (ref 95–107)
CO2: 22 MEQ/L (ref 20–31)
CREAT SERPL-MCNC: 0.7 MG/DL (ref 0.7–1.2)
EOSINOPHILS ABSOLUTE: 0.4 K/UL (ref 0–0.7)
EOSINOPHILS RELATIVE PERCENT: 7.4 %
GFR AFRICAN AMERICAN: >60
GFR NON-AFRICAN AMERICAN: >60
GLUCOSE BLD-MCNC: 130 MG/DL (ref 70–99)
HCT VFR BLD CALC: 40.1 % (ref 42–52)
HEMOGLOBIN: 14 G/DL (ref 14–18)
INR BLD: 1
LYMPHOCYTES ABSOLUTE: 1 K/UL (ref 1–4.8)
LYMPHOCYTES RELATIVE PERCENT: 18.3 %
MCH RBC QN AUTO: 36 PG (ref 27–31.3)
MCHC RBC AUTO-ENTMCNC: 34.9 % (ref 33–37)
MCV RBC AUTO: 103.1 FL (ref 80–100)
MONOCYTES ABSOLUTE: 1 K/UL (ref 0.2–0.8)
MONOCYTES RELATIVE PERCENT: 17.9 %
NEUTROPHILS ABSOLUTE: 3.1 K/UL (ref 1.4–6.5)
NEUTROPHILS RELATIVE PERCENT: 55.6 %
PDW BLD-RTO: 13.2 % (ref 11.5–14.5)
PLATELET # BLD: 201 K/UL (ref 130–400)
POTASSIUM REFLEX MAGNESIUM: 4.9 MEQ/L (ref 3.4–4.9)
PROTHROMBIN TIME: 12.8 SEC (ref 12.3–14.9)
RBC # BLD: 3.89 M/UL (ref 4.7–6.1)
SODIUM BLD-SCNC: 130 MEQ/L (ref 135–144)
TOTAL PROTEIN: 5.6 G/DL (ref 6.3–8)
WBC # BLD: 5.6 K/UL (ref 4.8–10.8)

## 2021-10-08 PROCEDURE — 6370000000 HC RX 637 (ALT 250 FOR IP): Performed by: INTERNAL MEDICINE

## 2021-10-08 PROCEDURE — 85025 COMPLETE CBC W/AUTO DIFF WBC: CPT

## 2021-10-08 PROCEDURE — APPSS15 APP SPLIT SHARED TIME 0-15 MINUTES: Performed by: NURSE PRACTITIONER

## 2021-10-08 PROCEDURE — 97162 PT EVAL MOD COMPLEX 30 MIN: CPT

## 2021-10-08 PROCEDURE — 6360000002 HC RX W HCPCS: Performed by: INTERNAL MEDICINE

## 2021-10-08 PROCEDURE — 80076 HEPATIC FUNCTION PANEL: CPT

## 2021-10-08 PROCEDURE — 80048 BASIC METABOLIC PNL TOTAL CA: CPT

## 2021-10-08 PROCEDURE — 85610 PROTHROMBIN TIME: CPT

## 2021-10-08 PROCEDURE — 36415 COLL VENOUS BLD VENIPUNCTURE: CPT

## 2021-10-08 PROCEDURE — 2580000003 HC RX 258: Performed by: INTERNAL MEDICINE

## 2021-10-08 PROCEDURE — 99233 SBSQ HOSP IP/OBS HIGH 50: CPT | Performed by: PSYCHIATRY & NEUROLOGY

## 2021-10-08 PROCEDURE — 6370000000 HC RX 637 (ALT 250 FOR IP): Performed by: NURSE PRACTITIONER

## 2021-10-08 RX ORDER — PANTOPRAZOLE SODIUM 40 MG/1
40 TABLET, DELAYED RELEASE ORAL
Qty: 30 TABLET | Refills: 3 | Status: SHIPPED | OUTPATIENT
Start: 2021-10-09

## 2021-10-08 RX ORDER — CALCIUM CARBONATE 200(500)MG
500 TABLET,CHEWABLE ORAL 3 TIMES DAILY PRN
Status: DISCONTINUED | OUTPATIENT
Start: 2021-10-08 | End: 2021-10-08 | Stop reason: HOSPADM

## 2021-10-08 RX ORDER — LANOLIN ALCOHOL/MO/W.PET/CERES
100 CREAM (GRAM) TOPICAL DAILY
Qty: 30 TABLET | Refills: 3 | Status: SHIPPED | OUTPATIENT
Start: 2021-10-09

## 2021-10-08 RX ADMIN — ANTACID TABLETS 500 MG: 500 TABLET, CHEWABLE ORAL at 12:24

## 2021-10-08 RX ADMIN — ONDANSETRON 4 MG: 2 INJECTION INTRAMUSCULAR; INTRAVENOUS at 05:24

## 2021-10-08 RX ADMIN — PANTOPRAZOLE SODIUM 40 MG: 40 TABLET, DELAYED RELEASE ORAL at 05:23

## 2021-10-08 RX ADMIN — Medication 100 MG: at 09:16

## 2021-10-08 RX ADMIN — ACETAMINOPHEN 650 MG: 325 TABLET ORAL at 09:15

## 2021-10-08 RX ADMIN — Medication 15 ML: at 09:17

## 2021-10-08 RX ADMIN — CEFTRIAXONE SODIUM 1000 MG: 1 INJECTION, POWDER, FOR SOLUTION INTRAMUSCULAR; INTRAVENOUS at 05:23

## 2021-10-08 RX ADMIN — LACTULOSE 20 G: 20 SOLUTION ORAL at 09:16

## 2021-10-08 ASSESSMENT — PAIN SCALES - GENERAL
PAINLEVEL_OUTOF10: 0
PAINLEVEL_OUTOF10: 0
PAINLEVEL_OUTOF10: 3

## 2021-10-08 ASSESSMENT — ENCOUNTER SYMPTOMS
COUGH: 0
ABDOMINAL PAIN: 0
SHORTNESS OF BREATH: 0
TROUBLE SWALLOWING: 0
NAUSEA: 0
VOMITING: 0

## 2021-10-08 NOTE — PROGRESS NOTES
Physical Therapy Med Surg Initial Assessment  Fac[facility]/Department[facility]  [facility]       NAME: Dayron Romero  : 1980 (41 y.o.)  MRN: [id number]  CODE STATUS: Full Code    Date of Service: 10/8/2021    Patient Diagnosis(es): Hyponatremia [E87.1]  Upper GI bleed [K92.2]  Altered mental status, unspecified altered mental status type [R41.82]   Chief Complaint   Patient presents with    Altered Mental Status     Patient Active Problem List    Diagnosis Date Noted    S/P  shunt     Ataxia     Hyponatremia 10/05/2021    GI bleed 10/05/2021    Elevated LFTs 10/05/2021    Hypokalemia 10/05/2021    Hydrocephalus ([facility] [address] 10/05/2021    Encephalopathy acute 10/05/2021        Past Medical History:   Diagnosis Date    Brain tumor ([facility] [address]     Graves disease      No past surgical history on file. Chart Reviewed: Yes  Patient assessed for rehabilitation services?: Yes  Family / Caregiver Present: No  General Comment  Comments: Pt resting in prone in bed, agreeable to PT eval    Restrictions:  Restrictions/Precautions: Fall Risk (high fall risk per Arango score)     SUBJECTIVE: Subjective: Pt denies pain currently.     Pain  Pre Treatment Pain Screening  Pain at present: 0  Scale Used: Numeric Score  Intervention List: Patient able to continue with treatment    Post Treatment Pain Screening:   Pain Screening  Patient Currently in Pain: No  Pain Assessment  Pain Assessment: 0-10  Pain Level: 0    Prior Level of Function:  Social/Functional History  Lives With: Alone  Type of Home: Apartment  Home Layout: One level  Home Access: Level entry  Bathroom Shower/Tub: Tub/Shower unit  Bathroom Equipment:  (none)  Home Equipment:  (none)  ADL Assistance: Independent  Homemaking Assistance: Independent  Homemaking Responsibilities: Yes  Ambulation Assistance: Independent (no AD)  Transfer Assistance: Independent  Active : No  Patient's  Info: family or public transit    OBJECTIVE: Vision: Impaired  Vision Exceptions: Wears glasses for reading  Hearing: Exceptions to [facility]  Hearing Exceptions: Hard of hearing/hearing concerns; No hearing aid    Cognition:  Overall Orientation Status: Within Functional Limits  Follows Commands: Within Functional Limits    Observation/Palpation  Posture: Good  Observation: pleasant, cooperative, no acute distress noted    ROM:  RLE AROM: WFL  LLE AROM : WFL    Strength:  Strength RLE  Strength RLE: WFL  Strength LLE  Strength LLE: WFL    Neuro:  Balance  Sitting - Static: Good  Sitting - Dynamic: Good  Standing - Static: Good;- (1 LOB requiring steadying Min A, delayed stepping response)  Standing - Dynamic: Fair;+     Motor Control  Gross Motor?: Exceptions  Comments: gait ataxia, mild delay in balance reactions  Sensation  Overall Sensation Status: WFL    Bed mobility  Rolling to Right: Independent  Supine to Sit: Independent  Sit to Supine: Independent    Transfers  Sit to Stand: Independent  Stand to sit: Independent    Ambulation  Ambulation?: Yes  Ambulation 1  Surface: level tile  Device: No Device  Assistance: Stand by assistance  Gait Deviations: Staggers  Distance: 40ft with turns, limited by pt participation  Comments: 1 LOB requiring Min A to correct, otherwises mild LOB with ability to self recover              Activity Tolerance  Activity Tolerance: Patient Tolerated treatment well          PT Education  PT Education: PT Role;Plan of Care;Goals; General Safety  Patient Education: complications of bedrest, benefits of mobility, use of call light for assistance    ASSESSMENT:   Body structures, Functions, Activity limitations: Decreased safe awareness;Decreased balance;Decreased functional mobility   Decision Making: Medium Complexity  History: med  Exam: med  Clinical Presentation: med    Prognosis: Good  Patient Education: complications of bedrest, benefits of mobility, use of call light for assistance  Barriers to Learning: motivation/particpation    DISCHARGE RECOMMENDATIONS:  Discharge Recommendations: Continue to assess pending progress    Assessment: Pt demonstrates dynamic standing balance and safety deficits. Pt would benefit from PT to address fall risk and improve gait/balance to faciliate safe d/c home at highest indep level. REQUIRES PT FOLLOW UP: Yes      PLAN OF CARE:  Plan  Times per week: 3-6  Current Treatment Recommendations: Strengthening, Transfer Training, Neuromuscular Re-education, Patient/Caregiver Education & Training, Equipment Evaluation, Education, & procurement, IADL Training, Gait Training, Balance Training, Functional Mobility Training, Stair training, Home Exercise Program, Safety Education & Training  Safety Devices  Type of devices: Left in bed, Call light within reach, Bed alarm in place    Goals:  Patient goals : \"go home\"  Long term goals  Long term goal 1: Bed mobiity with indep  Long term goal 2: Functional transfers with indep  Long term goal 3: Amb 150ft with indep  Long term goal 4: BOWMAN >41/56 to demosntrate low fall risk    [facility] (6 CLICK) Aramis Ramirez 28 Inpatient Mobility Raw Score : 20     Therapy Time:   Individual   Time In 1016   Time Out 1029   Minutes [address], 10/08/21 at 10:39 AM         Definitions for assistance levels  Independent = pt does not require any physical supervision or assistance from another person for activity completion. Device may be needed.   Stand by assistance = pt requires verbal cues or instructions from another person, close to but not touching, to perform the activity  Minimal assistance= pt performs 75% or more of the activity; assistance is required to complete the activity  Moderate assistance= pt performs 50% of the activity; assistance is required to complete the activity  Maximal assistance = pt performs 25% of the activity; assistance is required to complete the activity  Dependent = pt requires total physical assistance to

## 2021-10-08 NOTE — CARE COORDINATION
Per physician and RN, patient is being discharged home today. Transportation will be set up by unit secretary, RN to confirm address, CM to contact family with whom patient lives. This CM attempted to contact patient's mother Seb Rosas at [phone] (number listed in [facility] notes as emergency contact). Call went to a recorded message for an insurance company. CM then attempted to call 2nd emergency contact listed in [facility] notes - sister Brian List at [phone]- no answer with brief message left requesting call back to [facility] phone [phone].

## 2021-10-08 NOTE — PROGRESS NOTES
Physician Progress Note    10/8/2021   9:14 AM    Name:  Omid Mccracken  MRN:    [id number]      Day: 3     Admit Date: 10/4/2021 10:00 PM  PCP: Elaine Curran MD    Code Status:  Full Code    Subjective:     He denies complaints at this time but states he feels too weak to be able to go home today.     Current Facility-Administered Medications   Medication Dose Route Frequency Provider Last Rate Last Admin    lactulose (CHRONULAC) 10 GM/15ML solution 20 g  20 g Oral Daily Jason Clunes, DO        pantoprazole (PROTONIX) tablet 40 mg  40 mg Oral QAM AC Jason Clunes, DO   40 mg at 10/08/21 1001    thiamine tablet 100 mg  100 mg Oral Daily Jason Clunes, DO        sodium chloride flush 0.9 % injection 5-40 mL  5-40 mL IntraVENous 2 times per day [facility] Benjamin Bloom MD   5 mL at 10/07/21 2326    sodium chloride flush 0.9 % injection 5-40 mL  5-40 mL IntraVENous PRN Kati Silverman MD        0.9 % sodium chloride infusion  25 mL IntraVENous PRN Kati Petit MD        ondansetron (ZOFRAN-ODT) disintegrating tablet 4 mg  4 mg Oral Q8H PRN Kati Silverman MD        Or    ondansetron [facility]) injection 4 mg  4 mg IntraVENous Q6H PRN Kati Petit MD   4 mg at 10/08/21 0524    polyethylene glycol (GLYCOLAX) packet 17 g  17 g Oral Daily PRN Kati Petit MD        acetaminophen (TYLENOL) tablet 650 mg  650 mg Oral Q6H PRN Kati Petit MD        Or    acetaminophen (TYLENOL) suppository 650 mg  650 mg Rectal Q6H PRN Kati Silverman MD        potassium chloride 10 mEq/100 mL IVPB (Peripheral Line)  10 mEq IntraVENous PRN [facility] Benjamin Bloom  mL/hr at 10/07/21 0921 10 mEq at 10/07/21 0921    magnesium sulfate 2000 mg in 50 mL IVPB premix  2,000 mg IntraVENous PRN Kati Petit MD        sodium chloride (PF) 0.9 % injection 10 mL  10 mL IntraVENous BID Kati Petit MD   10 mL at 10/07/21 0921    cefTRIAXone (ROCEPHIN) 1000 mg IVPB in 50 mL D5W minibag  1,000 mg IntraVENous Q24H Joseluis Inman MD   Stopped at 10/08/21 0606    sodium chloride flush 0.9 % injection 5-40 mL  5-40 mL IntraVENous 2 times per day Nicoletto Bolzan-Roche, APRN - CNP   5 mL at 10/07/21 2326    sodium chloride flush 0.9 % injection 5-40 mL  5-40 mL IntraVENous PRN Nicoletto Bolzan-Roche, APRN - CNP        0.9 % sodium chloride infusion  25 mL IntraVENous PRN Nicoletto Bolzan-Roche, APRN - CNP        CENTRUM/CERTA-ANI with minerals oral solution 15 mL  15 mL Oral Daily Nicoletto Bolzan-Roche, APRN - CNP   15 mL at 10/07/21 0921    LORazepam (ATIVAN) tablet 1 mg  1 mg Oral Q1H PRN Nicoletto Bolzan-Roche, APRN - CNP        Or    LORazepam (ATIVAN) injection 1 mg  1 mg IntraVENous Q1H PRN Nicoletto Bolzan-Roche, APRN - CNP        Or    LORazepam (ATIVAN) tablet 2 mg  2 mg Oral Q1H PRN Nicoletto Bolzan-Roche, APRN - CNP        Or    LORazepam (ATIVAN) injection 2 mg  2 mg IntraVENous Q1H PRN Nicoletto Bolzan-Roche, APRN - CNP   2 mg at 10/06/21 2256    Or    LORazepam (ATIVAN) tablet 3 mg  3 mg Oral Q1H PRN Nicoletto Bolzan-Roche, APRN - CNP        Or    LORazepam (ATIVAN) injection 3 mg  3 mg IntraVENous Q1H PRN Nicoletto Bolzan-Roche, APRN - CNP        Or    LORazepam (ATIVAN) tablet 4 mg  4 mg Oral Q1H PRN Nicoletto Bolzan-Roche, APRN - CNP        Or    LORazepam (ATIVAN) injection 4 mg  4 mg IntraVENous Q1H PRN Nicoletto Bolzan-Roche, APRN - CNP           Physical Examination:      Vitals:  BP (!) 91/53 Comment: Notified Viky SALMON of low blood pressure. Pulse 88   Temp 99 °F (37.2 °C)   Resp 17   Ht 5' (1.524 m)   Wt 130 lb (59 kg)   SpO2 97%   BMI 25.39 kg/m²   Temp (24hrs), Av.1 °F (37.3 °C), Min:99 °F (37.2 °C), Max:99.1 °F (37.3 °C)      General appearance: Thin, malnourished, and chronically ill-appearing. Appears much older than stated age. Oriented to person.   He knows he is in hospital but thought it was Mikhail. He knows the year but not the month. Lungs: clear to auscultation bilaterally, normal effort  Heart: regular rate and rhythm, no murmur  Abdomen: soft, nontender, nondistended, bowel sounds present, no masses  Extremities: no edema, redness, tenderness in the calves. Cap refill <2s  Skin: no gross lesions, rashes    Data:     Labs:  Recent Labs     10/07/21  0934 10/08/21  0821   WBC 5.4 5.6   HGB 13.6* 14.0    201     Recent Labs     10/07/21  0323 10/07/21  0934   * 131*   K 2.8* 2.8*   CL 99 96   CO2 22 20   BUN 4* 5*   CREATININE 0.60* 0.69*   GLUCOSE 94 161*     Recent Labs     10/06/21  0320 10/07/21  0934   * 157*   * 95*   BILITOT 1.3* 0.5   ALKPHOS 71 72       Assessment and Plan:        1. Metabolic, toxic encephalopathy secondary to hypovolemic hyponatremia and alcohol use disorder: Improved  -Hyponatremia improved  -Alcoholic ketoacidosis treated-anion gap improved  -CIWA protocol with as needed benzodiazepine  -On empiric ceftriaxone. No obvious infection identified. No leukocytosis-discontinue antibiotic  -EEG pending. Neurology following  -Appreciate neurosurgery evaluation- shunt is functioning. No signs of shunt infection  -Mobilize with PT/OT    2. Tarry stool: H&H stable. No further tarry stool noted. Transitioned to oral PPI    3. Hypokalemia: Replete    4. Mild alcoholic hepatitis: Resolved    0915 still awaiting morning labs in particular sodium, potassium, hemoglobin     Diet: ADULT DIET; Clear Liquid  Full Code    PT/OT evaluation pending. Nearing discharge.     >35 minutes in total care time    Electronically signed by Raquel Banda DO on 10/8/2021 at 9:14 AM

## 2021-10-08 NOTE — PROGRESS NOTES
[id number] [facility] Neurology Daily Progress Note  Name: Cesario Elizabeth  Age: 41 y.o. Gender: male  CodeStatus: Full Code  Allergies: Red Dye    Chief Complaint:Altered Mental Status    Primary Care Provider: Anuradha Sandoval MD  InpatientTreatment Team: Treatment Team: Attending Provider: Sonia Lebron DO; Consulting Physician: Yasmine Golden; Consulting Physician: Sonia Lebron DO; Utilization Reviewer: Isabel Cruz RN; Consulting Physician: Pramod Irwin MD; Nursing Student: Viktoriya Martins; Utilization Reviewer: Ashley Juares, LUIS ANTONIO; Registered Nurse: Arnulfo Syed RN  Admission Date: 10/4/2021         Pt seen and examined for neuro follow up for metabolic encephalopathy in the setting of hyponatremia and ETOH abuse. Currently A&O x3, NAD. Off precedex gtt. Nonfocal. No seizures reported. Afebrile. No behavioral issues overnight. Taking p.o. Encephalopathy resolved. No notable changes are noted and encephalopathy continues  Vitals:    10/08/21 0900   BP: (!) 91/53   Pulse:    Resp: 17   Temp:    SpO2: 97%      Review of Systems   Unable to perform ROS: Mental status change   Constitutional: Positive for fatigue. HENT: Negative for trouble swallowing. Eyes: Negative for visual disturbance. Respiratory: Negative for cough and shortness of breath. Cardiovascular: Negative for leg swelling. Gastrointestinal: Negative for abdominal pain, nausea and vomiting. Genitourinary: Negative for difficulty urinating. Neurological: Negative for dizziness, tremors, seizures, syncope, facial asymmetry, speech difficulty, weakness, light-headedness, numbness and headaches. Psychiatric/Behavioral: Positive for confusion. Negative for behavioral problems and hallucinations. Physical Exam  Vitals and nursing note reviewed. Constitutional:       General: He is not in acute distress. Appearance: He is not ill-appearing or diaphoretic. HENT:      Head: Normocephalic and atraumatic.    Eyes:      General: No visual field deficit. Extraocular Movements: Extraocular movements intact. Pupils: Pupils are equal, round, and reactive to light. Cardiovascular:      Rate and Rhythm: Normal rate and regular rhythm. Pulmonary:      Effort: Pulmonary effort is normal. No respiratory distress. Breath sounds: Normal breath sounds. Abdominal:      General: Bowel sounds are normal.      Palpations: Abdomen is soft. Skin:     General: Skin is warm and dry. Neurological:      General: No focal deficit present. Mental Status: He is alert and oriented to person, place, and time. Cranial Nerves: No cranial nerve deficit, dysarthria or facial asymmetry. Motor: No weakness, tremor, atrophy, abnormal muscle tone, seizure activity or pronator drift.       Coordination: Coordination normal.               Medications:  Reviewed    Infusion Medications:    sodium chloride      sodium chloride       Scheduled Medications:    lactulose  20 g Oral Daily    pantoprazole  40 mg Oral QAM AC    thiamine  100 mg Oral Daily    sodium chloride flush  5-40 mL IntraVENous 2 times per day    sodium chloride (PF)  10 mL IntraVENous BID    sodium chloride flush  5-40 mL IntraVENous 2 times per day    CENTRUM/CERTA-ANI with minerals oral  15 mL Oral Daily     PRN Meds: calcium carbonate, benzocaine-menthol, sodium chloride flush, sodium chloride, ondansetron **OR** ondansetron, polyethylene glycol, acetaminophen **OR** acetaminophen, potassium chloride, magnesium sulfate, sodium chloride flush, sodium chloride, LORazepam **OR** LORazepam **OR** LORazepam **OR** LORazepam **OR** LORazepam **OR** LORazepam **OR** LORazepam **OR** LORazepam    Labs:   Recent Labs     10/06/21  0320 10/07/21  0934 10/08/21  0821   WBC 5.7 5.4 5.6   HGB 13.2* 13.6* 14.0   HCT 37.8* 38.8* 40.1*    149 201     Recent Labs     10/07/21  0323 10/07/21  0934 10/08/21  0821   * 131* 130*   K 2.8* 2.8* 4.9   CL 99 96 98   CO2 22 20 22   BUN 4* 5* 4*   CREATININE 0.60* 0.69* 0.70   CALCIUM 8.4* 8.3* 8.7     Recent Labs     10/06/21  0320 10/07/21  0934 10/08/21  0821   * 157* 105*   * 95* 89*   BILIDIR  --  <0.2 <0.2   BILITOT 1.3* 0.5 0.4   ALKPHOS 71 72 70     Recent Labs     10/08/21  0821   INR 1.0     No results for input(s): Hansa Lowers in the last 72 hours. Urinalysis:   No results found for: Duke Laws, BACTERIA, RBCUA, BLOODU, [address], [address] [place] [address]    Radiology:   Most recent    EEG No valid procedures specified. MRI of Brain No results found for this or any previous visit. No results found for this or any previous visit. MRA of the Head and Neck: No results found for this or any previous visit. No results found for this or any previous visit. No results found for this or any previous visit. CT of the Head: Results for orders placed during the hospital encounter of 10/04/21    CT HEAD WO CONTRAST    Narrative  CT HEAD WO CONTRAST, CT FACIAL BONES WO CONTRAST : 10/4/2021    CLINICAL HISTORY:  intox, jumped    COMPARISON: Head CT 3/17/2021. TECHNIQUE: ROUTINE    All CT scans at this facility use dose modulation, iterative reconstruction, and/or weight based dosing when appropriate to reduce radiation dose to as low as reasonably achievable. HEAD CT FINDINGS:    There is no intracranial hemorrhage, mass effect, midline shift, extra-axial collection, hydrocephalus, evidence of a recent ischemic infarct or acute skull fracture identified. A left frontal ventriculostomy shunt, right occipital cranioplasty, suboccipital craniectomy, and encephalomalacia of the right cerebellar hemisphere appears unchanged. FACE CT FINDINGS:    There is no fracture, paranasal sinus fluid, or significant posttraumatic soft tissue complication identified. The optic globes, orbits, temporomandibular joints and mandible are intact.     Impression  FINAL IMPRESSION:    NO ACUTE INTRACRANIAL PROCESS, ACUTE FRACTURE, OR SIGNIFICANT POSTTRAUMATIC COMPLICATION IDENTIFIED. CHRONIC FINDINGS, AS NOTED. No results found for this or any previous visit. No results found for this or any previous visit. Carotid duplex: No results found for this or any previous visit. No results found for this or any previous visit. No results found for this or any previous visit. Echo No results found for this or any previous visit. Assessment/Plan:    B[place]ick encephalopathy with hyponatremia and falls. Patient does have CT scan evidence of resection of the tumor in the cerebellum likely representing the gait issues. The shunt does not appear to be malfunctioning given that he has no hydrocephalus this is likely a shunt that was created at the time of his neuroblastoma evaluation and may have had hydrocephalus at that time. We will though continue to observe him for a shunt infection though unlikely. Patient has multiple other issues at this time to be solved before considering the diagnoses given that patient is encephalopathic from hyponatremia and likely also has underlying GI bleeding. We will continue to observe him and will consider further evaluation after his metabolic interventions are better. In the event that he does have underlying fevers which are unexplained we will consider lumbar puncture. Patient does not appear to be in any seizure activity but status epilepticus cannot be ruled out which could be convulsive and will arrange for an EEG tomorrow. Metabolic encephalopathy, slowly improving  ETOH abuse  HX craniotomy with  Neuroblastoma s/p shunt. No hydrocephalus currently. Shunt functioning. No signs shunt infection. Hyponatremia improving  EEG pending  Thiamine  CIWA protocol  Will follow   As noted no new changes are seen. Patient has a shunt which likely appears to be functioning at this time. Encephalopathy continues to improve.   Patient remains on CIWA protocol  Hyponatremia persists mildly  We will continue to follow  Okay to DC from neurology standpoint once medically cleared    Encephalopathy resolved   plans for discharge home today  Okay from neurology standpoint  EEG completed with report pending. I have personally performed a face to face diagnostic evaluation on this patient, reviewed all data and investigations, and am the sole provider of all clinical decisions on the neurological status of this patient. Events noted and encephalopathy is resolved. Patient still remains on CIWA protocol with some sedation      [facility] ROBERT Haque MD, [address] American Board of Psychiatry & Neurology  Board Certified in Vascular Neurology  Board Certified in Neuromuscular Medicine  Certified in Neurorehabilitation     Collaborating physicians: Dr Rubens Haque        Electronically signed by LISA John CNP on 10/8/2021 at 2:10 PM

## 2021-10-09 NOTE — CARE COORDINATION
SISTER SRAVANTHI PHONED. SHE STATES SHE HAD A MESSAGE ON HER PHONE. PT HAS BEEN DISCHARGED. SHE TO MEET WITH PATIENT.

## 2021-10-11 NOTE — DISCHARGE SUMMARY
[facility] Medicine Discharge Summary    Donald Spence  :  1980  MRN:  [id number]    Admit date:  10/4/2021    Discharge date:  10/8/2021    Admitting Physician:  Eden Pena MD  Primary Care Physician:  Alice Johnson MD    Discharge Diagnoses: Active Problems:    Hyponatremia    GI bleed    Elevated LFTs    Hypokalemia    Encephalopathy acute    S/P  shunt    Ataxia  Resolved Problems:    * No resolved hospital problems. *    Chief Complaint   Patient presents with    Altered Mental Status       Condition: improved  Activity: no strenuous activity   Diet: regular  Disposition: home  Functional Status: ambulatory    Significant Findings:     Labs Renal Latest Ref Rng & Units 10/8/2021 10/7/2021 10/7/2021 10/6/2021 10/6/2021   BUN 6 - 20 mg/dL 4(L) 5(L) 4(L) 5(L) 7   Cr 0.70 - 1.20 mg/dL 0.70 0.69(L) 0.60(L) 0.53(L) 0.60(L)   K 3.4 - 4.9 mEq/L 4.9 2.8(LL) 2. 8(LL) 3. 1(L) 3. 3(L)   Na 135 - 144 mEq/L 130(L) 131(L) 131(L) 133(L) 126(L)         Hospital Course:   35yo M PMH etOH use disorder, prior neuroblastoma s/p resection with  shunt and multiple revisions- he presented with confusion / wandering the streets. He was found to have toxic, metabolic encephalopathy due to [facility] use with mild withdrawal, mild alcoholic hepatitis, and hypovolemic hyponatremia. He improved with supportive care. On admission, tarry stool was documented but he had no further episodes during the hospitalization and Hb remained stable. He will follow closely with his PCP. He had neurology, neurosurgery, and GI evaluations during the hospitalization.        Discharge Medication List:   Olu Pierre   Home Medication Instructions [facility]:[id number]    Printed on:10/11/21 5055   Medication Information                      pantoprazole (PROTONIX) 40 MG tablet  Take 1 tablet by mouth every morning (before breakfast)             thiamine 100 MG tablet  Take 1 tablet by mouth daily DC time 37 minutes    Signed:  Sonia Lebron DO  10/11/2021, 10:57 AM

## 2021-10-23 PROCEDURE — 95819 EEG AWAKE AND ASLEEP: CPT | Performed by: PSYCHIATRY & NEUROLOGY

## 2022-05-23 LAB
ANION GAP SERPL CALCULATED.3IONS-SCNC: 14 MEQ/L (ref 9–15)
BUN BLDV-MCNC: 10 MG/DL (ref 6–20)
CALCIUM SERPL-MCNC: 9.3 MG/DL (ref 8.5–9.9)
CHLORIDE BLD-SCNC: 101 MEQ/L (ref 95–107)
CO2: 22 MEQ/L (ref 20–31)
CREAT SERPL-MCNC: 1.14 MG/DL (ref 0.7–1.2)
GFR AFRICAN AMERICAN: >60
GFR NON-AFRICAN AMERICAN: >60
GLUCOSE BLD-MCNC: 73 MG/DL (ref 70–99)
POTASSIUM SERPL-SCNC: 3.9 MEQ/L (ref 3.4–4.9)
SODIUM BLD-SCNC: 137 MEQ/L (ref 135–144)

## 2023-04-26 PROBLEM — E87.1 HYPONATREMIA: Status: ACTIVE | Noted: 2023-04-26

## 2023-04-26 PROBLEM — E53.8 FOLIC ACID DEFICIENCY: Status: ACTIVE | Noted: 2023-04-26

## 2023-04-26 PROBLEM — H61.23 BILATERAL IMPACTED CERUMEN: Status: ACTIVE | Noted: 2023-04-26

## 2023-04-26 PROBLEM — D49.6 BRAIN TUMOR (MULTI): Status: ACTIVE | Noted: 2023-04-26

## 2023-04-26 PROBLEM — R51.9 BILATERAL HEADACHES: Status: ACTIVE | Noted: 2023-04-26

## 2023-04-26 PROBLEM — D75.89 MACROCYTOSIS WITHOUT ANEMIA: Status: ACTIVE | Noted: 2023-04-26

## 2023-04-26 PROBLEM — E53.8 VITAMIN B 12 DEFICIENCY: Status: ACTIVE | Noted: 2023-04-26

## 2023-04-26 PROBLEM — J45.20 MILD INTERMITTENT ASTHMA (HHS-HCC): Status: ACTIVE | Noted: 2023-04-26

## 2023-04-26 PROBLEM — E03.9 HYPOTHYROIDISM: Status: ACTIVE | Noted: 2023-04-26

## 2023-04-26 PROBLEM — G56.02 CARPAL TUNNEL SYNDROME OF LEFT WRIST: Status: ACTIVE | Noted: 2023-04-26

## 2023-04-26 PROBLEM — G62.9 NEUROPATHY: Status: ACTIVE | Noted: 2023-04-26

## 2023-04-26 PROBLEM — M75.52 SUBDELTOID BURSITIS OF LEFT SHOULDER JOINT: Status: ACTIVE | Noted: 2023-04-26

## 2023-04-26 PROBLEM — K21.9 CHRONIC GERD: Status: ACTIVE | Noted: 2023-04-26

## 2023-04-26 PROBLEM — L25.9 CONTACT DERMATITIS: Status: ACTIVE | Noted: 2023-04-26

## 2023-06-26 DIAGNOSIS — E03.9 HYPOTHYROIDISM, UNSPECIFIED TYPE: Primary | ICD-10-CM

## 2023-06-26 DIAGNOSIS — G62.9 NEUROPATHY: ICD-10-CM

## 2023-06-26 DIAGNOSIS — D75.89 MACROCYTOSIS WITHOUT ANEMIA: ICD-10-CM

## 2023-06-26 DIAGNOSIS — E53.8 VITAMIN B 12 DEFICIENCY: ICD-10-CM

## 2023-06-26 RX ORDER — GABAPENTIN 300 MG/1
CAPSULE ORAL
Qty: 180 CAPSULE | Refills: 0 | Status: SHIPPED | OUTPATIENT
Start: 2023-06-26 | End: 2023-09-02

## 2023-06-26 RX ORDER — LANOLIN ALCOHOL/MO/W.PET/CERES
CREAM (GRAM) TOPICAL
Qty: 90 TABLET | Refills: 0 | Status: SHIPPED | OUTPATIENT
Start: 2023-06-26 | End: 2024-05-13 | Stop reason: SDUPTHER

## 2023-06-26 RX ORDER — FOLIC ACID 1 MG/1
TABLET ORAL
Qty: 90 TABLET | Refills: 0 | Status: SHIPPED | OUTPATIENT
Start: 2023-06-26 | End: 2024-05-13 | Stop reason: SDUPTHER

## 2023-06-26 RX ORDER — LEVOTHYROXINE SODIUM 88 UG/1
TABLET ORAL
Qty: 90 TABLET | Refills: 0 | Status: SHIPPED | OUTPATIENT
Start: 2023-06-26 | End: 2023-09-02

## 2023-08-31 DIAGNOSIS — E03.9 HYPOTHYROIDISM, UNSPECIFIED TYPE: ICD-10-CM

## 2023-08-31 DIAGNOSIS — G62.9 NEUROPATHY: ICD-10-CM

## 2023-09-02 RX ORDER — GABAPENTIN 300 MG/1
CAPSULE ORAL
Qty: 180 CAPSULE | Refills: 0 | Status: SHIPPED | OUTPATIENT
Start: 2023-09-02 | End: 2024-04-15 | Stop reason: SDUPTHER

## 2023-09-02 RX ORDER — LEVOTHYROXINE SODIUM 88 UG/1
88 TABLET ORAL DAILY
Qty: 90 TABLET | Refills: 0 | Status: SHIPPED | OUTPATIENT
Start: 2023-09-02 | End: 2024-04-15 | Stop reason: SDUPTHER

## 2024-04-15 ENCOUNTER — OFFICE VISIT (OUTPATIENT)
Dept: PRIMARY CARE | Facility: CLINIC | Age: 44
End: 2024-04-15
Payer: MEDICARE

## 2024-04-15 ENCOUNTER — LAB (OUTPATIENT)
Dept: LAB | Facility: LAB | Age: 44
End: 2024-04-15
Payer: MEDICARE

## 2024-04-15 VITALS
RESPIRATION RATE: 14 BRPM | HEIGHT: 60 IN | BODY MASS INDEX: 22.58 KG/M2 | HEART RATE: 82 BPM | WEIGHT: 115 LBS | SYSTOLIC BLOOD PRESSURE: 110 MMHG | OXYGEN SATURATION: 97 % | DIASTOLIC BLOOD PRESSURE: 64 MMHG

## 2024-04-15 DIAGNOSIS — D49.6 BRAIN TUMOR (MULTI): ICD-10-CM

## 2024-04-15 DIAGNOSIS — G62.9 NEUROPATHY: ICD-10-CM

## 2024-04-15 DIAGNOSIS — K21.9 CHRONIC GERD: ICD-10-CM

## 2024-04-15 DIAGNOSIS — E03.9 HYPOTHYROIDISM, UNSPECIFIED TYPE: ICD-10-CM

## 2024-04-15 DIAGNOSIS — D75.89 MACROCYTOSIS WITHOUT ANEMIA: ICD-10-CM

## 2024-04-15 DIAGNOSIS — R35.1 NOCTURIA: ICD-10-CM

## 2024-04-15 DIAGNOSIS — E55.9 MILD VITAMIN D DEFICIENCY: ICD-10-CM

## 2024-04-15 DIAGNOSIS — R39.15 URINARY URGENCY: ICD-10-CM

## 2024-04-15 DIAGNOSIS — C74.90 NEUROBLASTOMA (MULTI): ICD-10-CM

## 2024-04-15 DIAGNOSIS — G91.9 HYDROCEPHALUS, UNSPECIFIED TYPE (MULTI): ICD-10-CM

## 2024-04-15 DIAGNOSIS — J45.40 MODERATE PERSISTENT ASTHMA WITHOUT COMPLICATION (HHS-HCC): ICD-10-CM

## 2024-04-15 DIAGNOSIS — E03.9 HYPOTHYROIDISM, UNSPECIFIED TYPE: Primary | ICD-10-CM

## 2024-04-15 DIAGNOSIS — F41.9 ANXIETY: ICD-10-CM

## 2024-04-15 LAB
25(OH)D3 SERPL-MCNC: <7 NG/ML (ref 30–100)
ALBUMIN SERPL BCP-MCNC: 4.5 G/DL (ref 3.4–5)
ALP SERPL-CCNC: 78 U/L (ref 33–120)
ALT SERPL W P-5'-P-CCNC: 24 U/L (ref 10–52)
ANION GAP SERPL CALC-SCNC: 12 MMOL/L (ref 10–20)
APPEARANCE UR: CLEAR
AST SERPL W P-5'-P-CCNC: 30 U/L (ref 9–39)
BILIRUB SERPL-MCNC: 0.5 MG/DL (ref 0–1.2)
BILIRUB UR STRIP.AUTO-MCNC: NEGATIVE MG/DL
BUN SERPL-MCNC: 10 MG/DL (ref 6–23)
CALCIUM SERPL-MCNC: 9.2 MG/DL (ref 8.6–10.3)
CHLORIDE SERPL-SCNC: 109 MMOL/L (ref 98–107)
CO2 SERPL-SCNC: 22 MMOL/L (ref 21–32)
COLOR UR: YELLOW
CREAT SERPL-MCNC: 1.13 MG/DL (ref 0.5–1.3)
EGFRCR SERPLBLD CKD-EPI 2021: 82 ML/MIN/1.73M*2
ERYTHROCYTE [DISTWIDTH] IN BLOOD BY AUTOMATED COUNT: 14.7 % (ref 11.5–14.5)
GLUCOSE SERPL-MCNC: 75 MG/DL (ref 74–99)
GLUCOSE UR STRIP.AUTO-MCNC: NEGATIVE MG/DL
HCT VFR BLD AUTO: 38.4 % (ref 41–52)
HGB BLD-MCNC: 12.4 G/DL (ref 13.5–17.5)
KETONES UR STRIP.AUTO-MCNC: NEGATIVE MG/DL
LEUKOCYTE ESTERASE UR QL STRIP.AUTO: NEGATIVE
MCH RBC QN AUTO: 34.6 PG (ref 26–34)
MCHC RBC AUTO-ENTMCNC: 32.3 G/DL (ref 32–36)
MCV RBC AUTO: 107 FL (ref 80–100)
NITRITE UR QL STRIP.AUTO: NEGATIVE
NRBC BLD-RTO: 0 /100 WBCS (ref 0–0)
PH UR STRIP.AUTO: 7 [PH]
PLATELET # BLD AUTO: 197 X10*3/UL (ref 150–450)
POTASSIUM SERPL-SCNC: 3.8 MMOL/L (ref 3.5–5.3)
PROT SERPL-MCNC: 6.8 G/DL (ref 6.4–8.2)
PROT UR STRIP.AUTO-MCNC: NEGATIVE MG/DL
PSA SERPL-MCNC: 0.58 NG/ML
RBC # BLD AUTO: 3.58 X10*6/UL (ref 4.5–5.9)
RBC # UR STRIP.AUTO: NEGATIVE /UL
SODIUM SERPL-SCNC: 139 MMOL/L (ref 136–145)
SP GR UR STRIP.AUTO: 1.01
T4 FREE SERPL-MCNC: 0.67 NG/DL (ref 0.61–1.12)
TSH SERPL-ACNC: 37.3 MIU/L (ref 0.44–3.98)
UROBILINOGEN UR STRIP.AUTO-MCNC: <2 MG/DL
VIT B12 SERPL-MCNC: 596 PG/ML (ref 211–911)
WBC # BLD AUTO: 7.7 X10*3/UL (ref 4.4–11.3)

## 2024-04-15 PROCEDURE — 81003 URINALYSIS AUTO W/O SCOPE: CPT

## 2024-04-15 PROCEDURE — 84443 ASSAY THYROID STIM HORMONE: CPT

## 2024-04-15 PROCEDURE — 82607 VITAMIN B-12: CPT

## 2024-04-15 PROCEDURE — 84153 ASSAY OF PSA TOTAL: CPT

## 2024-04-15 PROCEDURE — 85027 COMPLETE CBC AUTOMATED: CPT

## 2024-04-15 PROCEDURE — 36415 COLL VENOUS BLD VENIPUNCTURE: CPT

## 2024-04-15 PROCEDURE — 82306 VITAMIN D 25 HYDROXY: CPT

## 2024-04-15 PROCEDURE — 99214 OFFICE O/P EST MOD 30 MIN: CPT | Performed by: INTERNAL MEDICINE

## 2024-04-15 PROCEDURE — 80053 COMPREHEN METABOLIC PANEL: CPT

## 2024-04-15 PROCEDURE — 84439 ASSAY OF FREE THYROXINE: CPT

## 2024-04-15 RX ORDER — IBUPROFEN 400 MG/1
400 TABLET ORAL EVERY 6 HOURS PRN
COMMUNITY

## 2024-04-15 RX ORDER — PANTOPRAZOLE SODIUM 40 MG/1
40 TABLET, DELAYED RELEASE ORAL DAILY
Qty: 90 TABLET | Refills: 3 | Status: SHIPPED | OUTPATIENT
Start: 2024-04-15 | End: 2025-04-15

## 2024-04-15 RX ORDER — LEVOTHYROXINE SODIUM 88 UG/1
88 TABLET ORAL DAILY
Qty: 90 TABLET | Refills: 0 | Status: SHIPPED | OUTPATIENT
Start: 2024-04-15 | End: 2024-04-16 | Stop reason: SDUPTHER

## 2024-04-15 RX ORDER — ALBUTEROL SULFATE 90 UG/1
2 AEROSOL, METERED RESPIRATORY (INHALATION) EVERY 4 HOURS PRN
Qty: 18 G | Refills: 11 | Status: SHIPPED | OUTPATIENT
Start: 2024-04-15 | End: 2025-04-15

## 2024-04-15 RX ORDER — FLUTICASONE PROPIONATE 100 UG/1
1 POWDER, METERED RESPIRATORY (INHALATION)
Qty: 120 EACH | Refills: 3 | Status: SHIPPED | OUTPATIENT
Start: 2024-04-15 | End: 2025-04-15

## 2024-04-15 RX ORDER — GABAPENTIN 300 MG/1
300 CAPSULE ORAL 2 TIMES DAILY
Qty: 180 CAPSULE | Refills: 3 | Status: SHIPPED | OUTPATIENT
Start: 2024-04-15 | End: 2025-04-15

## 2024-04-15 RX ORDER — PAROXETINE 10 MG/1
10 TABLET, FILM COATED ORAL EVERY MORNING
Qty: 30 TABLET | Refills: 1 | Status: SHIPPED | OUTPATIENT
Start: 2024-04-15 | End: 2024-06-14

## 2024-04-15 RX ORDER — TRAVOPROST OPHTHALMIC SOLUTION 0.04 MG/ML
1 SOLUTION OPHTHALMIC
COMMUNITY
Start: 2024-03-25

## 2024-04-15 ASSESSMENT — ENCOUNTER SYMPTOMS
AGITATION: 0
NAUSEA: 0
NERVOUS/ANXIOUS: 1
CHILLS: 0
FEVER: 0
CONSTIPATION: 0
COUGH: 0
SHORTNESS OF BREATH: 0
MYALGIAS: 0
DIAPHORESIS: 0
DIARRHEA: 0
JOINT SWELLING: 0
VOMITING: 0

## 2024-04-15 ASSESSMENT — COLUMBIA-SUICIDE SEVERITY RATING SCALE - C-SSRS
1. IN THE PAST MONTH, HAVE YOU WISHED YOU WERE DEAD OR WISHED YOU COULD GO TO SLEEP AND NOT WAKE UP?: NO
6. HAVE YOU EVER DONE ANYTHING, STARTED TO DO ANYTHING, OR PREPARED TO DO ANYTHING TO END YOUR LIFE?: NO
2. HAVE YOU ACTUALLY HAD ANY THOUGHTS OF KILLING YOURSELF?: NO

## 2024-04-15 ASSESSMENT — PATIENT HEALTH QUESTIONNAIRE - PHQ9
SUM OF ALL RESPONSES TO PHQ9 QUESTIONS 1 AND 2: 2
2. FEELING DOWN, DEPRESSED OR HOPELESS: SEVERAL DAYS
1. LITTLE INTEREST OR PLEASURE IN DOING THINGS: SEVERAL DAYS

## 2024-04-15 NOTE — PROGRESS NOTES
"Subjective   Silver Riley Jr. is a 43 y.o. male who presents for FOLLOW UP    HARD TO GET OTC VITAMINS INSURANCE DOES NOT COVER HARD TO AFFORD   REFILLS NEEDED IBUPROFEN IS COVERED UNDER INSURANCE    FELL 2 WKS AGO HIT HEAD HAS A LUMP ON RIGHT SIDE OF HEAD DID NOT GET CHECKED     HPI   FELL ON STAIRS MISSED A STEP GOT SMALL CUT ON RIGHT OCCIPITAL SCALP HEALING    NO LOC    [facility] NURSE STOPPING IN TO SEE HIM PERIODICALLY, NEED CHECK FOR ANXIETY.  GET ANXIOUS ALOT, MIND WON'T TURN OFF.  ALSO WANTED HIM TO BE TESTED FOR SHORT TERM MEMORY    RARELY LOSE TEMPER.  NOT TOO FATIGUED.  NOT WORKING HAVEN'T FOUND A JOB YET    NEVER HAD SEIZURE ISSUES AFTER BRAIN SURGERY OR SINCE    CAN'T DRIVE DUE TO VISION AND NEUROLOGIC STATUS AFTER BRAIN SURGERY    SEE EYE DOCTOR IN MAY FOR ELEVATED INTRAOCULAR PRESSURE    Review of Systems   Constitutional:  Negative for chills, diaphoresis and fever.   Respiratory:  Negative for cough and shortness of breath.    Cardiovascular:  Negative for chest pain and leg swelling.   Gastrointestinal:  Negative for constipation, diarrhea, nausea and vomiting.   Musculoskeletal:  Negative for joint swelling and myalgias.   Psychiatric/Behavioral:  Negative for agitation and behavioral problems. The patient is nervous/anxious.        Objective   /64   Pulse 82   Resp 14   Ht 1.499 m (4' 11\")   Wt 52.2 kg (115 lb)   SpO2 97%   BMI 23.23 kg/m²     Physical Exam  Vitals reviewed.   Constitutional:       General: He is not in acute distress.     Appearance: He is not ill-appearing.      Comments: SMALL STATURE   HENT:      Head:      Comments: S/P DISTANT CRANIOTOMY, HEALING ABRASION ON RIGHT OCCIPITAL SCALP CLOSED AND DRY  Eyes:      Extraocular Movements: Extraocular movements intact.      Pupils: Pupils are equal, round, and reactive to light.      Comments: DYSCONJUGATE GAZE   Cardiovascular:      Rate and Rhythm: Normal rate and regular rhythm.      Pulses: Normal pulses.      Heart " sounds:      No gallop.   Pulmonary:      Breath sounds: Normal breath sounds. No wheezing, rhonchi or rales.   Abdominal:      General: Abdomen is flat. Bowel sounds are normal.      Palpations: Abdomen is soft.      Tenderness: There is no guarding or rebound.   Musculoskeletal:      Right lower leg: No edema.      Left lower leg: No edema.   Neurological:      General: No focal deficit present.      Mental Status: Mental status is at baseline.         Assessment/Plan   Problem List Items Addressed This Visit       Brain tumor (Multi)    Chronic GERD    Relevant Medications    pantoprazole (ProtoNix) 40 mg EC tablet    Hypothyroidism - Primary    Relevant Medications    levothyroxine (Synthroid, Levoxyl) 88 mcg tablet    Other Relevant Orders    TSH with reflex to Free T4 if abnormal    Macrocytosis without anemia    Relevant Orders    Vitamin B12    Neuropathy    Relevant Medications    gabapentin (Neurontin) 300 mg capsule    Neuroblastoma (Multi)     RESECTED IN DISTANT PAST CURRENTLY QUIESCENT         Hydrocephalus, unspecified type (Multi)     STABLE AT PRESENT          Other Visit Diagnoses       Moderate persistent asthma without complication (HHS-HCC)        Relevant Medications    fluticasone (Flovent Diskus) 100 mcg/actuation diskus inhaler    albuterol (Ventolin HFA) 90 mcg/actuation inhaler    Anxiety        Relevant Medications    PARoxetine (Paxil) 10 mg tablet    Nocturia        Relevant Orders    Prostate Specific Antigen, Screen    Mild vitamin D deficiency        Relevant Orders    Comprehensive Metabolic Panel    CBC    Vitamin D 25-Hydroxy,Total (for eval of Vitamin D levels)    Urinary urgency        Relevant Orders    Urinalysis with Reflex Microscopic          Patient Instructions    RECOMMEND FOLLOW UP EXAM FOR GLAUCOMA CHECK AS PLANNED FOR MAY    2.  FASTING LABS ARE ORDERED FOR YOU, PLEASE GET THEM DONE AS YOUR LAST THYROID CHECK WAS 2 YEARS AGO.  WILL CHECK PSA LEVEL AND URINE ANALYSIS  DUE TO URINARY SYMPTOMS    3.  TRIAL LOW DOSE PAROXETINE 10 MG DAILY FOR ANXIETY.  SCRIPT SENT IN    4.  REFILLS ON MEDS ARE SENT IN FOR YOU    5.  MINI MENTAL STATUS EXAM WILL BE DONE TODAY FOR SHORT TERM MEMORY    6.  FOLLOW UP 1 MONTH    7.  QUIT SMOKING RECOMMENDED   21-Jun-2018 15:59

## 2024-04-15 NOTE — PATIENT INSTRUCTIONS
RECOMMEND FOLLOW UP EXAM FOR GLAUCOMA CHECK AS PLANNED FOR MAY    2.  FASTING LABS ARE ORDERED FOR YOU, PLEASE GET THEM DONE AS YOUR LAST THYROID CHECK WAS 2 YEARS AGO.  WILL CHECK PSA LEVEL AND URINE ANALYSIS DUE TO URINARY SYMPTOMS    3.  TRIAL LOW DOSE PAROXETINE 10 MG DAILY FOR ANXIETY.  SCRIPT SENT IN    4.  REFILLS ON MEDS ARE SENT IN FOR YOU    5.  MINI MENTAL STATUS EXAM WILL BE DONE TODAY FOR SHORT TERM MEMORY    6.  FOLLOW UP 1 MONTH    7.  QUIT SMOKING RECOMMENDED

## 2024-04-16 DIAGNOSIS — E03.9 HYPOTHYROIDISM, UNSPECIFIED TYPE: ICD-10-CM

## 2024-04-16 RX ORDER — LEVOTHYROXINE SODIUM 112 UG/1
112 TABLET ORAL DAILY
Qty: 90 TABLET | Refills: 3 | Status: SHIPPED | OUTPATIENT
Start: 2024-04-16 | End: 2025-04-16

## 2024-04-17 DIAGNOSIS — E55.9 VITAMIN D DEFICIENCY: Primary | ICD-10-CM

## 2024-05-13 ENCOUNTER — OFFICE VISIT (OUTPATIENT)
Dept: PRIMARY CARE | Facility: CLINIC | Age: 44
End: 2024-05-13
Payer: MEDICARE

## 2024-05-13 VITALS
HEIGHT: 60 IN | HEART RATE: 84 BPM | BODY MASS INDEX: 21.6 KG/M2 | DIASTOLIC BLOOD PRESSURE: 70 MMHG | SYSTOLIC BLOOD PRESSURE: 110 MMHG | RESPIRATION RATE: 14 BRPM | WEIGHT: 110 LBS | OXYGEN SATURATION: 98 %

## 2024-05-13 DIAGNOSIS — E53.8 FOLATE DEFICIENCY: ICD-10-CM

## 2024-05-13 DIAGNOSIS — E55.9 VITAMIN D DEFICIENCY: ICD-10-CM

## 2024-05-13 DIAGNOSIS — D75.89 MACROCYTOSIS WITHOUT ANEMIA: ICD-10-CM

## 2024-05-13 DIAGNOSIS — E53.8 VITAMIN B 12 DEFICIENCY: ICD-10-CM

## 2024-05-13 DIAGNOSIS — Z00.00 PREVENTATIVE HEALTH CARE: ICD-10-CM

## 2024-05-13 DIAGNOSIS — Z12.11 ENCOUNTER FOR SCREENING FOR MALIGNANT NEOPLASM OF COLON: ICD-10-CM

## 2024-05-13 DIAGNOSIS — Z00.00 MEDICARE ANNUAL WELLNESS VISIT, SUBSEQUENT: Primary | ICD-10-CM

## 2024-05-13 DIAGNOSIS — Z23 NEED FOR VACCINATION: ICD-10-CM

## 2024-05-13 DIAGNOSIS — Z00.00 ROUTINE GENERAL MEDICAL EXAMINATION AT HEALTH CARE FACILITY: ICD-10-CM

## 2024-05-13 PROCEDURE — 90472 IMMUNIZATION ADMIN EACH ADD: CPT | Performed by: INTERNAL MEDICINE

## 2024-05-13 PROCEDURE — G0009 ADMIN PNEUMOCOCCAL VACCINE: HCPCS | Performed by: INTERNAL MEDICINE

## 2024-05-13 PROCEDURE — 90677 PCV20 VACCINE IM: CPT | Performed by: INTERNAL MEDICINE

## 2024-05-13 PROCEDURE — 90715 TDAP VACCINE 7 YRS/> IM: CPT | Performed by: INTERNAL MEDICINE

## 2024-05-13 PROCEDURE — G0439 PPPS, SUBSEQ VISIT: HCPCS | Performed by: INTERNAL MEDICINE

## 2024-05-13 RX ORDER — LANOLIN ALCOHOL/MO/W.PET/CERES
1000 CREAM (GRAM) TOPICAL DAILY
Qty: 90 TABLET | Refills: 3 | Status: SHIPPED | OUTPATIENT
Start: 2024-05-13

## 2024-05-13 RX ORDER — FOLIC ACID 1 MG/1
1 TABLET ORAL DAILY
Qty: 90 TABLET | Refills: 3 | Status: SHIPPED | OUTPATIENT
Start: 2024-05-13

## 2024-05-13 RX ORDER — ASPIRIN 325 MG
50000 TABLET, DELAYED RELEASE (ENTERIC COATED) ORAL
Qty: 4 CAPSULE | Refills: 2 | Status: SHIPPED | OUTPATIENT
Start: 2024-05-19 | End: 2025-05-19

## 2024-05-13 ASSESSMENT — ENCOUNTER SYMPTOMS
DIAPHORESIS: 0
JOINT SWELLING: 0
CHILLS: 0
VOMITING: 0
FEVER: 0
DIARRHEA: 0
MYALGIAS: 0
CONSTIPATION: 0
SHORTNESS OF BREATH: 0
COUGH: 0
NAUSEA: 0

## 2024-05-13 ASSESSMENT — ACTIVITIES OF DAILY LIVING (ADL)
DRESSING: INDEPENDENT
MANAGING_FINANCES: INDEPENDENT
BATHING: INDEPENDENT
TAKING_MEDICATION: INDEPENDENT
DOING_HOUSEWORK: INDEPENDENT
GROCERY_SHOPPING: INDEPENDENT

## 2024-05-13 ASSESSMENT — PATIENT HEALTH QUESTIONNAIRE - PHQ9
1. LITTLE INTEREST OR PLEASURE IN DOING THINGS: NOT AT ALL
2. FEELING DOWN, DEPRESSED OR HOPELESS: NOT AT ALL
SUM OF ALL RESPONSES TO PHQ9 QUESTIONS 1 AND 2: 0

## 2024-05-13 NOTE — PROGRESS NOTES
"Subjective   Reason for Visit: Silver Riley Jr. is an 43 y.o. male here for a Medicare Wellness visit.    PT STATES HIM AND MOTHER CAN'T FIND RECOMMENDED DOSAGE FOR VITD3 RECOMMENDED DOSAGE 50,000 WEEKLY FOR 12 WKS   PER PT WILL BE HAVING EYE SUGERY  TO DRAIN PRESSURE OFF  Past Medical, Surgical, and Family History reviewed and updated in chart.    Reviewed all medications by prescribing practitioner or clinical pharmacist (such as prescriptions, OTCs, herbal therapies and supplements) and documented in the medical record.    HPI  GETTING EYE SURGERY TO HELP WITH EYE PRESSURE    WORKING AT [facility]      Patient Care Team:  Mike Wan MD as PCP - General (Internal Medicine)     Review of Systems   Constitutional:  Negative for chills, diaphoresis and fever.   Eyes:         PER HPI   Respiratory:  Negative for cough and shortness of breath.    Cardiovascular:  Negative for chest pain and leg swelling.   Gastrointestinal:  Negative for constipation, diarrhea, nausea and vomiting.   Musculoskeletal:  Negative for joint swelling and myalgias.       Objective   Vitals:  /70   Pulse 84   Resp 14   Ht 1.499 m (4' 11\")   Wt 49.9 kg (110 lb)   SpO2 98%   BMI 22.22 kg/m²       Physical Exam  Vitals reviewed.   Constitutional:       General: He is not in acute distress.     Appearance: He is not ill-appearing.      Comments: SMALL STATURE   HENT:      Head:      Comments: S/P DISTANT CRANIOTOMY, HEALING ABRASION ON RIGHT OCCIPITAL SCALP CLOSED AND DRY  Eyes:      Extraocular Movements: Extraocular movements intact.      Pupils: Pupils are equal, round, and reactive to light.      Comments: DYSCONJUGATE GAZE   Cardiovascular:      Rate and Rhythm: Normal rate and regular rhythm.      Pulses: Normal pulses.      Heart sounds:      No gallop.   Pulmonary:      Breath sounds: Normal breath sounds. No wheezing, rhonchi or rales.   Abdominal:      General: Abdomen is flat. Bowel sounds are " normal.      Palpations: Abdomen is soft.      Tenderness: There is no guarding or rebound.   Musculoskeletal:      Right lower leg: No edema.      Left lower leg: No edema.   Neurological:      General: No focal deficit present.      Mental Status: Mental status is at baseline.         Assessment/Plan   Problem List Items Addressed This Visit       Macrocytosis without anemia    Relevant Medications    folic acid (Folvite) 1 mg tablet    Vitamin B 12 deficiency    Relevant Medications    cyanocobalamin (Vitamin B-12) 1,000 mcg tablet    Medicare annual wellness visit, subsequent - Primary     Other Visit Diagnoses       Vitamin D deficiency        Relevant Medications    cholecalciferol (Vitamin D-3) 50,000 unit capsule (Start on 5/19/2024)    Other Relevant Orders    Vitamin D 25-Hydroxy,Total (for eval of Vitamin D levels)    Preventative health care        Relevant Orders    Tdap vaccine, age 7 years and older  (BOOSTRIX) (Completed)    Folate deficiency        Relevant Orders    Folate    Encounter for screening for malignant neoplasm of colon        Relevant Orders    Cologuard® colon cancer screening    Need for vaccination        Relevant Orders    Pneumococcal conjugate vaccine 20-valent IM (Completed)    Routine general medical examination at health care facility              Patient Instructions    LABS RECENTLY SHOWED YOU ARE EXTREMELY VITAMIN D DEFICIENT.  I SENT IN SCRIPT FOR VIT D3 50,000 IU WEEKLY FOR 12 WEEKS, AND THEN WHEN THAT IS COMPLETED, GOTO VIT D3 2000 IU DAILY PERMANENT SUPPLEMENT FOREVER    2.  RECHECK BLOOD TEST FOR VIT D IN 3 MONTHS    3.  PREVNAR-20 PNEUMONIA IMMUNIZATION AND TETANUS/PERTUSSIS BOOSTER ARE ADMINISTERED TO YOU TODAY, THEY LAST 10 YEARS    4.  COLOGUARD COLON CANCER SCREENING STOOL COLLECTION IS ORDERED FOR COLON CANCER SCREENING THAT IS SUPPOSED TO START NOW AT AGE 43    5.  ONGOING EYE EXAM FOLLOW UP AS YOU ARE DOING    6.  DIET/REGULAR EXERCISE FOR GENERAL HEALTH    7.   FOLLOW UP YEARLY OR AS NEEDED.    8.  PLEASE CALL IF YOU NEED SCRIPT REFILLS

## 2024-05-13 NOTE — PATIENT INSTRUCTIONS
LABS RECENTLY SHOWED YOU ARE EXTREMELY VITAMIN D DEFICIENT.  I SENT IN SCRIPT FOR VIT D3 50,000 IU WEEKLY FOR 12 WEEKS, AND THEN WHEN THAT IS COMPLETED, GOTO VIT D3 2000 IU DAILY PERMANENT SUPPLEMENT FOREVER    2.  RECHECK BLOOD TEST FOR VIT D IN 3 MONTHS    3.  PREVNAR-20 PNEUMONIA IMMUNIZATION AND TETANUS/PERTUSSIS BOOSTER ARE ADMINISTERED TO YOU TODAY, THEY LAST 10 YEARS    4.  COLOGUARD COLON CANCER SCREENING STOOL COLLECTION IS ORDERED FOR COLON CANCER SCREENING THAT IS SUPPOSED TO START NOW AT AGE 43    5.  ONGOING EYE EXAM FOLLOW UP AS YOU ARE DOING    6.  DIET/REGULAR EXERCISE FOR GENERAL HEALTH    7.  FOLLOW UP YEARLY OR AS NEEDED.    8.  PLEASE CALL IF YOU NEED SCRIPT REFILLS

## 2024-05-16 ENCOUNTER — APPOINTMENT (OUTPATIENT)
Dept: PRIMARY CARE | Facility: CLINIC | Age: 44
End: 2024-05-16
Payer: MEDICARE

## 2024-06-24 LAB — NONINV COLON CA DNA+OCC BLD SCRN STL QL: NEGATIVE

## 2025-04-14 ENCOUNTER — APPOINTMENT (OUTPATIENT)
Dept: PRIMARY CARE | Facility: CLINIC | Age: 45
End: 2025-04-14
Payer: MEDICARE

## 2025-04-14 VITALS
BODY MASS INDEX: 21.79 KG/M2 | HEIGHT: 60 IN | RESPIRATION RATE: 14 BRPM | HEART RATE: 78 BPM | DIASTOLIC BLOOD PRESSURE: 70 MMHG | OXYGEN SATURATION: 98 % | SYSTOLIC BLOOD PRESSURE: 110 MMHG | WEIGHT: 111 LBS

## 2025-04-14 DIAGNOSIS — Z00.00 PREVENTATIVE HEALTH CARE: ICD-10-CM

## 2025-04-14 DIAGNOSIS — G47.00 INSOMNIA, UNSPECIFIED TYPE: ICD-10-CM

## 2025-04-14 DIAGNOSIS — C74.90 NEUROBLASTOMA (MULTI): ICD-10-CM

## 2025-04-14 DIAGNOSIS — Z00.00 MEDICARE ANNUAL WELLNESS VISIT, SUBSEQUENT: Primary | ICD-10-CM

## 2025-04-14 DIAGNOSIS — E03.9 ACQUIRED HYPOTHYROIDISM: ICD-10-CM

## 2025-04-14 DIAGNOSIS — G91.9 HYDROCEPHALUS, UNSPECIFIED TYPE (MULTI): ICD-10-CM

## 2025-04-14 DIAGNOSIS — E53.8 VITAMIN B12 DEFICIENCY: ICD-10-CM

## 2025-04-14 DIAGNOSIS — E55.9 VITAMIN D DEFICIENCY: ICD-10-CM

## 2025-04-14 PROCEDURE — G0439 PPPS, SUBSEQ VISIT: HCPCS | Performed by: INTERNAL MEDICINE

## 2025-04-14 PROCEDURE — 3008F BODY MASS INDEX DOCD: CPT | Performed by: INTERNAL MEDICINE

## 2025-04-14 RX ORDER — TRAZODONE HYDROCHLORIDE 50 MG/1
50 TABLET ORAL NIGHTLY PRN
Qty: 30 TABLET | Refills: 3 | Status: SHIPPED | OUTPATIENT
Start: 2025-04-14 | End: 2026-04-14

## 2025-04-14 ASSESSMENT — ENCOUNTER SYMPTOMS
DIARRHEA: 0
CHILLS: 0
AGITATION: 0
DIAPHORESIS: 0
SHORTNESS OF BREATH: 0
FEVER: 0
COUGH: 0
DIZZINESS: 1
JOINT SWELLING: 0
VOMITING: 0
NAUSEA: 0
MYALGIAS: 0
CONSTIPATION: 0

## 2025-04-14 ASSESSMENT — ACTIVITIES OF DAILY LIVING (ADL)
DRESSING: INDEPENDENT
MANAGING_FINANCES: INDEPENDENT
TAKING_MEDICATION: INDEPENDENT
BATHING: INDEPENDENT
DOING_HOUSEWORK: INDEPENDENT
GROCERY_SHOPPING: INDEPENDENT

## 2025-04-14 ASSESSMENT — PATIENT HEALTH QUESTIONNAIRE - PHQ9
SUM OF ALL RESPONSES TO PHQ9 QUESTIONS 1 AND 2: 0
2. FEELING DOWN, DEPRESSED OR HOPELESS: NOT AT ALL
1. LITTLE INTEREST OR PLEASURE IN DOING THINGS: NOT AT ALL

## 2025-04-14 NOTE — ASSESSMENT & PLAN NOTE
Orders:    Lipid Panel; Future    TSH with reflex to Free T4 if abnormal; Future    Comprehensive Metabolic Panel; Future    CBC; Future

## 2025-04-14 NOTE — PROGRESS NOTES
Subjective   Reason for Visit: Silver Riley Jr. is an 44 y.o. male here for a Medicare Wellness visit.    TROUBLE SLEEPING ? IF AMBIEN WILL HELP SAYS STEP DAD AND DAD USED PER MOM   USES [employer] FOR SLEEP AND APPETITE    OFF A LOT HAS HAD A FEW FALLS IN PAST WEEK RECENT EYE SURGERY TO HELP WITH VISION   HAS NOT HAD A BEER IN 6 MONTHS             HPI  NO SEIZURES    FALLEN A FEW TIMES ONT HE STAIRS.    LONGSTANDING BALANCE ISSUES.      Patient Care Team:  Mike Wan MD as PCP - General (Internal Medicine)     Review of Systems   Constitutional:  Negative for chills, diaphoresis and fever.   Respiratory:  Negative for cough and shortness of breath.    Cardiovascular:  Negative for chest pain and leg swelling.   Gastrointestinal:  Negative for constipation, diarrhea, nausea and vomiting.   Musculoskeletal:  Negative for joint swelling and myalgias.   Neurological:  Positive for dizziness.   Psychiatric/Behavioral:  Negative for agitation and behavioral problems.        Objective   Vitals:  There were no vitals taken for this visit.      Physical Exam  Vitals reviewed.   Constitutional:       General: He is not in acute distress.     Appearance: He is not ill-appearing.      Comments: SMALL STATURE   HENT:      Head:      Comments: S/P DISTANT CRANIOTOMY, HEALING ABRASION ON RIGHT OCCIPITAL SCALP CLOSED AND DRY  Eyes:      Extraocular Movements: Extraocular movements intact.      Pupils: Pupils are equal, round, and reactive to light.      Comments: DYSCONJUGATE GAZE   Cardiovascular:      Rate and Rhythm: Normal rate and regular rhythm.      Pulses: Normal pulses.      Heart sounds:      No gallop.   Pulmonary:      Breath sounds: Normal breath sounds. No wheezing, rhonchi or rales.   Abdominal:      General: Abdomen is flat. Bowel sounds are normal.      Palpations: Abdomen is soft.      Tenderness: There is no guarding or rebound.   Musculoskeletal:      Right lower leg: No edema.      Left  lower leg: No edema.   Neurological:      General: No focal deficit present.      Mental Status: Mental status is at baseline.         Assessment & Plan  Insomnia, unspecified type         Acquired hypothyroidism    Orders:    Lipid Panel; Future    TSH with reflex to Free T4 if abnormal; Future    Comprehensive Metabolic Panel; Future    CBC; Future    Vitamin B12 deficiency    Orders:    Vitamin B12; Future    Vitamin D deficiency    Orders:    Vitamin D 25-Hydroxy,Total (for eval of Vitamin D levels); Future    Preventative health care    Orders:    Hepatitis C Antibody; Future    Neuroblastoma (Multi)  MONITOR WITH NEUROSURGERY         Hydrocephalus, unspecified type (Multi)  MONITOR WITH NEUROSURGERY         Medicare annual wellness visit, subsequent            Patient Instructions    ZOLPIDEM/AMBIEN CARRIES A VERY HIGH RISK OF FALLING, SO IN YOU WHO HAVE BALANCE ISSUES AND HAVE ALREADY HAD FALLING, ZOLPIDEM IS NOT RECOMMENDED    2.  BE WARE THAT CANNIBIS PRODUCTS CAN PREDISPOSE TO BALANCE ISSUES AND FALLING IN THOSE WHO HAVE HAD NEUROLOGICAL ISSUES IN THE PAST    3.  TRAZODONE NIGHTLY AS NEEDED FOR SLEEP SCRIPT SENT IN FOR YOU    4.  YOU ARE CAUGHT UP ON YOUR IMMUNIZATIONS.  NEXT COLOGUARD TEST WILL BE DUE IN 2027    5/  FOLLOW UP 6 MONTHS OR AS NEEDED    6.  FASTING LABS ARE ORDERED FOR TODAY    7.  STOP SMOKING HIGHLY ENCOURAGED.

## 2025-04-14 NOTE — PATIENT INSTRUCTIONS
ZOLPIDEM/AMBIEN CARRIES A VERY HIGH RISK OF FALLING, SO IN YOU WHO HAVE BALANCE ISSUES AND HAVE ALREADY HAD FALLING, ZOLPIDEM IS NOT RECOMMENDED    2.  BE WARE THAT CANNIBIS PRODUCTS CAN PREDISPOSE TO BALANCE ISSUES AND FALLING IN THOSE WHO HAVE HAD NEUROLOGICAL ISSUES IN THE PAST    3.  TRAZODONE NIGHTLY AS NEEDED FOR SLEEP SCRIPT SENT IN FOR YOU    4.  YOU ARE CAUGHT UP ON YOUR IMMUNIZATIONS.  NEXT COLOGUARD TEST WILL BE DUE IN 2027    5/  FOLLOW UP 6 MONTHS OR AS NEEDED    6.  FASTING LABS ARE ORDERED FOR TODAY    7.  STOP SMOKING HIGHLY ENCOURAGED.

## 2025-08-06 ENCOUNTER — TELEPHONE (OUTPATIENT)
Dept: PRIMARY CARE | Facility: CLINIC | Age: 45
End: 2025-08-06
Payer: MEDICARE

## 2025-08-06 DIAGNOSIS — G47.00 INSOMNIA, UNSPECIFIED TYPE: ICD-10-CM

## 2025-08-06 DIAGNOSIS — R51.9 BILATERAL HEADACHES: Primary | ICD-10-CM

## 2025-08-06 RX ORDER — TRAZODONE HYDROCHLORIDE 50 MG/1
50 TABLET ORAL NIGHTLY PRN
Qty: 90 TABLET | Refills: 3 | Status: SHIPPED | OUTPATIENT
Start: 2025-08-06 | End: 2026-08-06

## 2025-08-06 RX ORDER — IBUPROFEN 400 MG/1
400 TABLET, FILM COATED ORAL EVERY 6 HOURS PRN
Qty: 60 TABLET | Refills: 0 | Status: SHIPPED | OUTPATIENT
Start: 2025-08-06

## 2025-08-06 NOTE — TELEPHONE ENCOUNTER
Pt asking for refills on:    -traZODone (Desyrel) 50 mg tablet 1xday at bedtime    -Ibuprofen 800mg ??  Dont see this on med list    Send to [facility]

## 2025-08-08 ENCOUNTER — TELEPHONE (OUTPATIENT)
Dept: PRIMARY CARE | Facility: CLINIC | Age: 45
End: 2025-08-08
Payer: MEDICARE

## 2025-10-13 ENCOUNTER — APPOINTMENT (OUTPATIENT)
Dept: PRIMARY CARE | Facility: CLINIC | Age: 45
End: 2025-10-13
Payer: MEDICARE